# Patient Record
Sex: MALE | Race: WHITE | Employment: OTHER | URBAN - METROPOLITAN AREA
[De-identification: names, ages, dates, MRNs, and addresses within clinical notes are randomized per-mention and may not be internally consistent; named-entity substitution may affect disease eponyms.]

---

## 2017-01-04 RX ORDER — LISINOPRIL AND HYDROCHLOROTHIAZIDE 12.5; 1 MG/1; MG/1
1 TABLET ORAL
Qty: 30 TABLET | Refills: 0 | Status: SHIPPED | OUTPATIENT
Start: 2017-01-04 | End: 2017-09-14

## 2017-01-04 NOTE — TELEPHONE ENCOUNTER
Pt called and is completely out of Lisinopril-Hydrochlorothiazide 10-12.5 MG Oral Tab. Stated that Dr. Nelia Oscar would not refill until has med check appt. Scheduled med check appt for 1/23/17. Soonest he could get in as he needs after work appt.

## 2017-01-24 ENCOUNTER — TELEPHONE (OUTPATIENT)
Dept: FAMILY MEDICINE CLINIC | Facility: CLINIC | Age: 42
End: 2017-01-24

## 2017-01-24 NOTE — TELEPHONE ENCOUNTER
Call back to mel/spouse reaches unidentified voice mail. Voice mailbox full-unable to leave [facility].

## 2017-01-24 NOTE — TELEPHONE ENCOUNTER
Last ofc visit 2/9/15 for cough/acute bronchitis.   Call to mel/spouse-sts \"dr morrison previously told Loomis Money he should have colonoscopy because his father  of colon cancer at age 54.  sts pt has occasional constipation but no pain, bleeding or

## 2017-01-24 NOTE — TELEPHONE ENCOUNTER
I would suggest patient to schedule physical appointment. He needs to also have other preventive screening tests done. Then we can refer him for colonoscopy.   Otherwise I would suggest to see Dr. Nilda Glez and you can give patient information so he can call

## 2017-04-27 ENCOUNTER — APPOINTMENT (OUTPATIENT)
Dept: LAB | Age: 42
End: 2017-04-27
Attending: OTOLARYNGOLOGY
Payer: COMMERCIAL

## 2017-04-27 DIAGNOSIS — I10 ESSENTIAL HYPERTENSION, BENIGN: ICD-10-CM

## 2017-04-27 PROCEDURE — 93010 ELECTROCARDIOGRAM REPORT: CPT | Performed by: INTERNAL MEDICINE

## 2017-04-27 PROCEDURE — 80053 COMPREHEN METABOLIC PANEL: CPT

## 2017-04-27 PROCEDURE — 93005 ELECTROCARDIOGRAM TRACING: CPT

## 2017-04-27 PROCEDURE — 36415 COLL VENOUS BLD VENIPUNCTURE: CPT

## 2017-05-05 ENCOUNTER — LABORATORY ENCOUNTER (OUTPATIENT)
Dept: LAB | Age: 42
End: 2017-05-05
Attending: OTOLARYNGOLOGY
Payer: COMMERCIAL

## 2017-05-05 DIAGNOSIS — Z00.00 EXAMINATION: Primary | ICD-10-CM

## 2017-05-05 PROCEDURE — 88304 TISSUE EXAM BY PATHOLOGIST: CPT

## 2017-09-14 ENCOUNTER — OFFICE VISIT (OUTPATIENT)
Dept: FAMILY MEDICINE CLINIC | Facility: CLINIC | Age: 42
End: 2017-09-14

## 2017-09-14 VITALS
HEIGHT: 72 IN | TEMPERATURE: 98 F | WEIGHT: 250 LBS | SYSTOLIC BLOOD PRESSURE: 162 MMHG | DIASTOLIC BLOOD PRESSURE: 84 MMHG | BODY MASS INDEX: 33.86 KG/M2 | HEART RATE: 80 BPM | RESPIRATION RATE: 18 BRPM

## 2017-09-14 DIAGNOSIS — I10 ESSENTIAL HYPERTENSION: ICD-10-CM

## 2017-09-14 DIAGNOSIS — Z83.71 FAMILY HISTORY OF COLONIC POLYPS: ICD-10-CM

## 2017-09-14 DIAGNOSIS — E78.00 HYPERCHOLESTEREMIA: ICD-10-CM

## 2017-09-14 DIAGNOSIS — I10 ESSENTIAL HYPERTENSION: Primary | ICD-10-CM

## 2017-09-14 DIAGNOSIS — Z80.0 FAMILY HX OF COLON CANCER: ICD-10-CM

## 2017-09-14 DIAGNOSIS — R73.9 HYPERGLYCEMIA: ICD-10-CM

## 2017-09-14 PROCEDURE — 99214 OFFICE O/P EST MOD 30 MIN: CPT | Performed by: FAMILY MEDICINE

## 2017-09-14 RX ORDER — LISINOPRIL AND HYDROCHLOROTHIAZIDE 12.5; 1 MG/1; MG/1
1 TABLET ORAL
Qty: 30 TABLET | Refills: 2 | Status: SHIPPED | OUTPATIENT
Start: 2017-09-14 | End: 2017-09-14

## 2017-09-14 NOTE — PATIENT INSTRUCTIONS
Take blood pressure medication 1 tablet daily in the morning. Do fasting blood work. Call Dr. Merlinda Mattock for colonoscopy. Schedule  follow-up visit in 2 3 months to make sure what medications bringing the blood pressure to optimal control.   Nanci armstrong · Zamiast z salena de la cruzysta? ze akash. · Gdy tylko mo?liwe, i?? na piechot? lub jecha? omari lainez samochodem. · Grabi? li?cie, diamantenywa? prace w ogródku lub napmalena w chasu.   · Byc aktywnym yunior gillespiezynajmniej 30 minut yunior vera wi?kszo?? dni

## 2017-09-14 NOTE — PROGRESS NOTES
Olga Marie is a 38year old male. cc hypertension, hyperlipidemia, family history of colon cancer, hyperglycemia  HPI:   Pretension patient stopped his medications sometime ago. He was not seen in the office since 2015.   Denies any chest pain no short BP (!) 162/84   Pulse 80   Temp 97.6 °F (36.4 °C) (Oral)   Resp 18   Ht 72\"   Wt 250 lb   BMI 33.91 kg/m²   GENERAL: well developed, well nourished,in no apparent distress, obese  SKIN: no rashes,no suspicious lesions  HEENT: atraumatic, normocephalic,ear

## 2017-09-15 RX ORDER — LISINOPRIL AND HYDROCHLOROTHIAZIDE 12.5; 1 MG/1; MG/1
TABLET ORAL
Qty: 90 TABLET | Refills: 1 | Status: SHIPPED | OUTPATIENT
Start: 2017-09-15 | End: 2018-01-12 | Stop reason: CLARIF

## 2017-10-24 ENCOUNTER — APPOINTMENT (OUTPATIENT)
Dept: LAB | Age: 42
End: 2017-10-24
Attending: FAMILY MEDICINE
Payer: COMMERCIAL

## 2017-10-24 DIAGNOSIS — I10 ESSENTIAL HYPERTENSION: ICD-10-CM

## 2017-10-24 DIAGNOSIS — R73.9 HYPERGLYCEMIA: ICD-10-CM

## 2017-10-24 DIAGNOSIS — E78.00 HYPERCHOLESTEREMIA: ICD-10-CM

## 2017-10-24 PROCEDURE — 80061 LIPID PANEL: CPT | Performed by: FAMILY MEDICINE

## 2017-10-24 PROCEDURE — 36415 COLL VENOUS BLD VENIPUNCTURE: CPT | Performed by: FAMILY MEDICINE

## 2017-10-24 PROCEDURE — 83036 HEMOGLOBIN GLYCOSYLATED A1C: CPT | Performed by: FAMILY MEDICINE

## 2017-10-24 PROCEDURE — 80053 COMPREHEN METABOLIC PANEL: CPT | Performed by: FAMILY MEDICINE

## 2018-01-09 ENCOUNTER — TELEPHONE (OUTPATIENT)
Dept: FAMILY MEDICINE CLINIC | Facility: CLINIC | Age: 43
End: 2018-01-09

## 2018-01-09 NOTE — TELEPHONE ENCOUNTER
FYI Spouse called for pt. (She is on his HIPPA) he has been having right arm numbness / tingling off/ on since leo. He has also been having headaches off/ on.  I advised spouse it would be best for him to go to the Urgent care today for evaluation sin

## 2018-01-12 ENCOUNTER — OFFICE VISIT (OUTPATIENT)
Dept: FAMILY MEDICINE CLINIC | Facility: CLINIC | Age: 43
End: 2018-01-12

## 2018-01-12 VITALS
BODY MASS INDEX: 33.72 KG/M2 | SYSTOLIC BLOOD PRESSURE: 146 MMHG | HEART RATE: 75 BPM | DIASTOLIC BLOOD PRESSURE: 82 MMHG | TEMPERATURE: 97 F | WEIGHT: 249 LBS | RESPIRATION RATE: 18 BRPM | HEIGHT: 72 IN

## 2018-01-12 DIAGNOSIS — I10 ESSENTIAL HYPERTENSION: ICD-10-CM

## 2018-01-12 DIAGNOSIS — R20.0 NUMBNESS AND TINGLING IN RIGHT HAND: Primary | ICD-10-CM

## 2018-01-12 DIAGNOSIS — M54.50 ACUTE RIGHT-SIDED LOW BACK PAIN WITHOUT SCIATICA: ICD-10-CM

## 2018-01-12 DIAGNOSIS — R20.2 NUMBNESS AND TINGLING IN RIGHT HAND: Primary | ICD-10-CM

## 2018-01-12 PROCEDURE — 99214 OFFICE O/P EST MOD 30 MIN: CPT | Performed by: FAMILY MEDICINE

## 2018-01-12 RX ORDER — DICLOFENAC SODIUM 75 MG/1
75 TABLET, DELAYED RELEASE ORAL 2 TIMES DAILY
Qty: 30 TABLET | Refills: 0 | Status: SHIPPED | OUTPATIENT
Start: 2018-01-12 | End: 2018-01-12

## 2018-01-12 RX ORDER — LISINOPRIL AND HYDROCHLOROTHIAZIDE 25; 20 MG/1; MG/1
1 TABLET ORAL DAILY
Qty: 30 TABLET | Refills: 1 | Status: SHIPPED | OUTPATIENT
Start: 2018-01-12 | End: 2018-01-12

## 2018-01-12 NOTE — PATIENT INSTRUCTIONS
Call [phone] to schedule EMG test on the right upper extremity. Diclofenac  75 mg 1 tablet twice a day. Warm compresses to the back. Increase lisinopril/hydrochlorothiazide to 20/25 mg 1 tablet daily.     Monitor blood pressure at home write it down

## 2018-01-12 NOTE — PROGRESS NOTES
Estephania Barbosa is a 38year old male. cc right arm pain, low back pain. Hypertension  HPI:   Patient is coming to the office complaining of having pain in the right lower back. It is going on and off.   Started before [place] he was doing some lifting b GENERAL HEALTH: feels well otherwise  SKIN: denies any unusual skin lesions or rashes  HEENT no congestion no runny nose no sore throat  Neck no neck pain  RESPIRATORY: denies shortness of breath with exertion  CARDIOVASCULAR: denies chest pain on exerti Sig: Take 1 tablet by mouth daily. Call [phone] to schedule EMG test on the right upper extremity. Diclofenac  75 mg 1 tablet twice a day. Warm compresses to the back. Increase lisinopril/hydrochlorothiazide to 20/25 mg 1 tablet daily.     Mon

## 2018-01-15 RX ORDER — DICLOFENAC SODIUM 75 MG/1
TABLET, DELAYED RELEASE ORAL
Qty: 30 TABLET | Refills: 0 | Status: SHIPPED | OUTPATIENT
Start: 2018-01-15 | End: 2018-04-25 | Stop reason: ALTCHOICE

## 2018-01-15 RX ORDER — LISINOPRIL AND HYDROCHLOROTHIAZIDE 25; 20 MG/1; MG/1
1 TABLET ORAL DAILY
Qty: 90 TABLET | Refills: 0 | Status: SHIPPED | OUTPATIENT
Start: 2018-01-15 | End: 2018-04-25

## 2018-01-15 NOTE — TELEPHONE ENCOUNTER
Not protocol medication. LOV : 9/14/17 medication follow up   Last labs done :10/24/17  Next appointment :not yet made. Please see pending medication. Refill if appropriate.    Last refill:    Date:1/12/17  Amount :30 tablets no refill   Medication: dic

## 2018-04-16 ENCOUNTER — OFFICE VISIT (OUTPATIENT)
Dept: ELECTROPHYSIOLOGY | Facility: HOSPITAL | Age: 43
End: 2018-04-16
Attending: FAMILY MEDICINE
Payer: COMMERCIAL

## 2018-04-16 DIAGNOSIS — R20.2 NUMBNESS AND TINGLING IN RIGHT HAND: ICD-10-CM

## 2018-04-16 DIAGNOSIS — R20.0 NUMBNESS AND TINGLING IN RIGHT HAND: ICD-10-CM

## 2018-04-16 PROCEDURE — 95886 MUSC TEST DONE W/N TEST COMP: CPT | Performed by: OTHER

## 2018-04-16 PROCEDURE — 95909 NRV CNDJ TST 5-6 STUDIES: CPT | Performed by: OTHER

## 2018-04-16 NOTE — PROCEDURES
[address] [place] [address]  [place], [facility]  [phone]            Patient: Anita Love Sex: Male  Patient ID: [id number] YOB: 1975      Visit Date: 4/16/2018 07:51  Patient Age On Visit Date: Spontaneous MUAP Recruitment   Muscle Nerve Roots IA Fib PSW Fasc H.F. Amp Dur. PPP Pattern   R. Deltoid Axillary C5-C6 N None None None None N N N N   R. Triceps brachii Radial C6-C8 N None None None None N N N N   R.  Biceps brachii Musculocutaneous C5-C

## 2018-04-25 ENCOUNTER — OFFICE VISIT (OUTPATIENT)
Dept: FAMILY MEDICINE CLINIC | Facility: CLINIC | Age: 43
End: 2018-04-25

## 2018-04-25 VITALS
WEIGHT: 245 LBS | BODY MASS INDEX: 33.18 KG/M2 | HEART RATE: 71 BPM | HEIGHT: 72 IN | DIASTOLIC BLOOD PRESSURE: 74 MMHG | OXYGEN SATURATION: 98 % | TEMPERATURE: 97 F | RESPIRATION RATE: 16 BRPM | SYSTOLIC BLOOD PRESSURE: 138 MMHG

## 2018-04-25 DIAGNOSIS — Z12.11 SCREEN FOR COLON CANCER: ICD-10-CM

## 2018-04-25 DIAGNOSIS — M79.604 LOW BACK PAIN RADIATING TO RIGHT LEG: ICD-10-CM

## 2018-04-25 DIAGNOSIS — Z80.0 FAMILY HISTORY OF COLON CANCER: ICD-10-CM

## 2018-04-25 DIAGNOSIS — M54.50 LOW BACK PAIN RADIATING TO RIGHT LEG: ICD-10-CM

## 2018-04-25 DIAGNOSIS — G56.01 CARPAL TUNNEL SYNDROME OF RIGHT WRIST: ICD-10-CM

## 2018-04-25 DIAGNOSIS — I10 ESSENTIAL HYPERTENSION: Primary | ICD-10-CM

## 2018-04-25 PROCEDURE — 99214 OFFICE O/P EST MOD 30 MIN: CPT | Performed by: FAMILY MEDICINE

## 2018-04-25 RX ORDER — LISINOPRIL AND HYDROCHLOROTHIAZIDE 25; 20 MG/1; MG/1
1 TABLET ORAL DAILY
Qty: 90 TABLET | Refills: 0 | Status: SHIPPED | OUTPATIENT
Start: 2018-04-25 | End: 2018-10-24

## 2018-04-25 NOTE — PROGRESS NOTES
Rheba Babinski is a 38year old male. cc hypertension, numbness and tingling in the right hand, low back pain, family history of colon cancer. HPI:   Hypertension patient is taking medications regularly doing well. Needs refill.   No chest pain no shortne pain  RESPIRATORY: denies shortness of breath with exertion  CARDIOVASCULAR: denies chest pain on exertion  GI: denies abdominal pain and denies heartburn  NEURO: denies headaches, numbness and tingling of the right hand  Musculoskeletal low back pain radi

## 2018-04-25 NOTE — PATIENT INSTRUCTIONS
Continue current meds. Watch diet for fats and carbs. Stay active. Vitamin B12 1000 mcg daily over-the-counter. Call GI Dr. Velma Bermeo for colonoscopy. Schedule physical therapy for your back. Schedule complete physical in 3 months.

## 2018-10-12 ENCOUNTER — TELEPHONE (OUTPATIENT)
Dept: FAMILY MEDICINE CLINIC | Facility: CLINIC | Age: 43
End: 2018-10-12

## 2018-10-12 NOTE — TELEPHONE ENCOUNTER
Spoke with the wife Curt Darnell and informed her that her if 10/24/2018 ok for her  to come in to see Dr. Prado Memos.    Per the wife she stated ok and I will tell him.

## 2018-10-24 ENCOUNTER — LAB ENCOUNTER (OUTPATIENT)
Dept: LAB | Age: 43
End: 2018-10-24
Attending: FAMILY MEDICINE
Payer: COMMERCIAL

## 2018-10-24 ENCOUNTER — OFFICE VISIT (OUTPATIENT)
Dept: FAMILY MEDICINE CLINIC | Facility: CLINIC | Age: 43
End: 2018-10-24

## 2018-10-24 VITALS
HEIGHT: 72 IN | DIASTOLIC BLOOD PRESSURE: 100 MMHG | HEART RATE: 78 BPM | BODY MASS INDEX: 34.4 KG/M2 | TEMPERATURE: 98 F | SYSTOLIC BLOOD PRESSURE: 154 MMHG | WEIGHT: 254 LBS | RESPIRATION RATE: 18 BRPM

## 2018-10-24 DIAGNOSIS — I10 ESSENTIAL HYPERTENSION: ICD-10-CM

## 2018-10-24 DIAGNOSIS — R10.9 LEFT SIDED ABDOMINAL PAIN OF UNKNOWN CAUSE: ICD-10-CM

## 2018-10-24 DIAGNOSIS — I10 ESSENTIAL HYPERTENSION: Primary | ICD-10-CM

## 2018-10-24 PROCEDURE — 83690 ASSAY OF LIPASE: CPT

## 2018-10-24 PROCEDURE — 81003 URINALYSIS AUTO W/O SCOPE: CPT

## 2018-10-24 PROCEDURE — 80053 COMPREHEN METABOLIC PANEL: CPT

## 2018-10-24 PROCEDURE — 99214 OFFICE O/P EST MOD 30 MIN: CPT | Performed by: FAMILY MEDICINE

## 2018-10-24 PROCEDURE — 85025 COMPLETE CBC W/AUTO DIFF WBC: CPT

## 2018-10-24 PROCEDURE — 36415 COLL VENOUS BLD VENIPUNCTURE: CPT

## 2018-10-24 RX ORDER — LISINOPRIL AND HYDROCHLOROTHIAZIDE 25; 20 MG/1; MG/1
1 TABLET ORAL DAILY
Qty: 90 TABLET | Refills: 0 | Status: SHIPPED | OUTPATIENT
Start: 2018-10-24 | End: 2018-11-30

## 2018-10-24 NOTE — PROGRESS NOTES
Fabrizio Brantley is a 37year old male. cc hypertension, left-sided abdominal pain. HPI:   Patient is coming for follow-up of hypertension he is not taking medication for the past 2 weeks his blood pressure was very high.   He denies any headaches no chest p (36.6 °C) (Oral)   Resp 18   Ht 72\"   Wt 254 lb   BMI 34.45 kg/m²   GENERAL: well developed, well nourished,in no apparent distress  SKIN: no rashes,no suspicious lesions  HEENT: atraumatic, normocephalic,ears and throat are clear  NECK: supple,no adenopa

## 2018-10-24 NOTE — PATIENT INSTRUCTIONS
Restart lisinopril/hydrochlorothiazide. Keep good hydration. We will call you blood test results when those are back. Call [phone] to schedule ultrasound of the abdomen.   Follow-up at the end of November for blood pressure visit  Work on smoking cess

## 2018-10-25 DIAGNOSIS — E87.5 HYPERKALEMIA: Primary | ICD-10-CM

## 2018-11-05 ENCOUNTER — HOSPITAL ENCOUNTER (OUTPATIENT)
Dept: ULTRASOUND IMAGING | Age: 43
Discharge: HOME | End: 2018-11-05
Attending: FAMILY MEDICINE
Payer: COMMERCIAL

## 2018-11-05 DIAGNOSIS — R10.9 LEFT SIDED ABDOMINAL PAIN OF UNKNOWN CAUSE: ICD-10-CM

## 2018-11-05 PROCEDURE — 76700 US EXAM ABDOM COMPLETE: CPT | Performed by: FAMILY MEDICINE

## 2018-11-16 ENCOUNTER — APPOINTMENT (OUTPATIENT)
Dept: LAB | Age: 43
End: 2018-11-16
Attending: FAMILY MEDICINE
Payer: COMMERCIAL

## 2018-11-16 DIAGNOSIS — E87.5 HYPERKALEMIA: ICD-10-CM

## 2018-11-16 PROCEDURE — 80048 BASIC METABOLIC PNL TOTAL CA: CPT

## 2018-11-16 PROCEDURE — 36415 COLL VENOUS BLD VENIPUNCTURE: CPT

## 2018-11-30 ENCOUNTER — OFFICE VISIT (OUTPATIENT)
Dept: FAMILY MEDICINE CLINIC | Facility: CLINIC | Age: 43
End: 2018-11-30

## 2018-11-30 VITALS
TEMPERATURE: 97 F | SYSTOLIC BLOOD PRESSURE: 116 MMHG | WEIGHT: 246 LBS | HEART RATE: 75 BPM | RESPIRATION RATE: 18 BRPM | DIASTOLIC BLOOD PRESSURE: 72 MMHG | HEIGHT: 72 IN | BODY MASS INDEX: 33.32 KG/M2

## 2018-11-30 DIAGNOSIS — E66.9 OBESITY (BMI 30-39.9): ICD-10-CM

## 2018-11-30 DIAGNOSIS — R73.9 HYPERGLYCEMIA: ICD-10-CM

## 2018-11-30 DIAGNOSIS — K76.0 FATTY LIVER: ICD-10-CM

## 2018-11-30 DIAGNOSIS — I10 ESSENTIAL HYPERTENSION: Primary | ICD-10-CM

## 2018-11-30 PROCEDURE — 99214 OFFICE O/P EST MOD 30 MIN: CPT | Performed by: FAMILY MEDICINE

## 2018-11-30 RX ORDER — LISINOPRIL AND HYDROCHLOROTHIAZIDE 25; 20 MG/1; MG/1
1 TABLET ORAL DAILY
Qty: 90 TABLET | Refills: 1 | Status: SHIPPED | OUTPATIENT
Start: 2018-11-30 | End: 2019-12-31

## 2018-11-30 NOTE — PROGRESS NOTES
Jarrett Mcnamara is a 37year old male. cc hypertension, hyperglycemia, fatty liver  HPI:   Patient is coming for follow-up of hypertension taking medications doing well. Blood pressure stable.   Tolerates medication without any problems    Hyperglycemia susan edema  Psychiatric - alert  and oriented x3, normal mood   ASSESSMENT AND PLAN:     Essential hypertension  (primary encounter diagnosis)  Fatty liver  Obesity (bmi 30-39. 9)  Hyperglycemia    No orders of the defined types were placed in this encounter.

## 2018-11-30 NOTE — PATIENT INSTRUCTIONS
Continue current medications. Try to lose some weight 10-15 pounds. Stay active. Low-fat diet. Low carbohydrate diet. Schedule physical for April of this year.

## 2019-01-31 RX ORDER — LISINOPRIL AND HYDROCHLOROTHIAZIDE 25; 20 MG/1; MG/1
1 TABLET ORAL DAILY
Qty: 90 TABLET | Refills: 0 | Status: SHIPPED | OUTPATIENT
Start: 2019-01-31 | End: 2020-02-18

## 2019-02-21 ENCOUNTER — HOSPITAL ENCOUNTER (OUTPATIENT)
Age: 44
Discharge: HOME | End: 2019-02-21
Payer: COMMERCIAL

## 2019-02-21 VITALS
TEMPERATURE: 99 F | DIASTOLIC BLOOD PRESSURE: 75 MMHG | RESPIRATION RATE: 20 BRPM | OXYGEN SATURATION: 97 % | HEART RATE: 114 BPM | SYSTOLIC BLOOD PRESSURE: 115 MMHG

## 2019-02-21 DIAGNOSIS — B34.9 VIRAL SYNDROME: ICD-10-CM

## 2019-02-21 DIAGNOSIS — J02.0 STREP PHARYNGITIS: Primary | ICD-10-CM

## 2019-02-21 LAB
POCT INFLUENZA A: NEGATIVE
POCT INFLUENZA B: NEGATIVE
POCT RAPID STREP: POSITIVE

## 2019-02-21 PROCEDURE — 99213 OFFICE O/P EST LOW 20 MIN: CPT

## 2019-02-21 PROCEDURE — 87502 INFLUENZA DNA AMP PROBE: CPT | Performed by: PHYSICIAN ASSISTANT

## 2019-02-21 PROCEDURE — 87430 STREP A AG IA: CPT | Performed by: PHYSICIAN ASSISTANT

## 2019-02-21 PROCEDURE — 99204 OFFICE O/P NEW MOD 45 MIN: CPT

## 2019-02-21 RX ORDER — IBUPROFEN 600 MG/1
600 TABLET ORAL ONCE
Status: COMPLETED | OUTPATIENT
Start: 2019-02-21 | End: 2019-02-21

## 2019-02-21 RX ORDER — AMOXICILLIN 875 MG/1
875 TABLET, COATED ORAL 2 TIMES DAILY
Qty: 20 TABLET | Refills: 0 | Status: SHIPPED | OUTPATIENT
Start: 2019-02-21 | End: 2019-03-03

## 2019-02-21 NOTE — ED INITIAL ASSESSMENT (HPI)
Chills- started last night at about 9 pm. Chills with sweating. ,pt took theraflu and nyquil. Pt still this having aches. Pt states he feels hot and cold.       Sore throat- started today

## 2019-02-21 NOTE — ED PROVIDER NOTES
Patient Seen in: [facility]    History   Patient presents with:   Body ache and/or chills  Sore Throat    Stated Complaint: fever & fatigue    HPI    49-year-old male here with complaint of sore throat pain in tandem with fever c normal.   Left Ear: Tympanic membrane and external ear normal.   Nose: Nose normal.   Mouth/Throat: Uvula is midline and mucous membranes are normal. Posterior oropharyngeal erythema present.    Uvula midline, no trismus or drooling, also in the posterior p taking these medications    amoxicillin 875 MG Oral Tab  Take 1 tablet (875 mg total) by mouth 2 (two) times daily for 10 days.   Qty: 20 tablet Refills: 0

## 2019-02-21 NOTE — ED NOTES
Pt here for chills, bodyaches,sore throat since yesterday night at 9pm. Today he felt better better but not 100%. Pt appears tired-looking.

## 2019-05-11 ENCOUNTER — TELEPHONE (OUTPATIENT)
Dept: FAMILY MEDICINE CLINIC | Facility: CLINIC | Age: 44
End: 2019-05-11

## 2019-05-11 NOTE — TELEPHONE ENCOUNTER
Please give patient a call. He is due for his physical for this year. Please schedule sometimes this  Summer/ early fall so we can update order preventive services for him.   Thank you

## 2019-05-21 NOTE — TELEPHONE ENCOUNTER
LM with wife for patient to schedule physical. She is going to check patients schedule and call back.

## 2019-08-17 ENCOUNTER — HOSPITAL ENCOUNTER (OUTPATIENT)
Age: 44
Discharge: HOME | End: 2019-08-17
Attending: FAMILY MEDICINE
Payer: COMMERCIAL

## 2019-08-17 VITALS
WEIGHT: 240 LBS | SYSTOLIC BLOOD PRESSURE: 121 MMHG | HEIGHT: 72 IN | HEART RATE: 94 BPM | RESPIRATION RATE: 18 BRPM | BODY MASS INDEX: 32.51 KG/M2 | OXYGEN SATURATION: 98 % | TEMPERATURE: 98 F | DIASTOLIC BLOOD PRESSURE: 79 MMHG

## 2019-08-17 DIAGNOSIS — J20.9 ACUTE BRONCHITIS, UNSPECIFIED ORGANISM: Primary | ICD-10-CM

## 2019-08-17 PROCEDURE — 99213 OFFICE O/P EST LOW 20 MIN: CPT

## 2019-08-17 PROCEDURE — 99214 OFFICE O/P EST MOD 30 MIN: CPT

## 2019-08-17 RX ORDER — PREDNISONE 20 MG/1
40 TABLET ORAL DAILY
Qty: 10 TABLET | Refills: 0 | Status: SHIPPED | OUTPATIENT
Start: 2019-08-17 | End: 2019-08-22

## 2019-08-17 RX ORDER — FLUTICASONE PROPIONATE 50 MCG
2 SPRAY, SUSPENSION (ML) NASAL DAILY
Qty: 16 G | Refills: 0 | Status: SHIPPED | OUTPATIENT
Start: 2019-08-17 | End: 2019-09-16

## 2019-08-17 RX ORDER — BENZONATATE 100 MG/1
100 CAPSULE ORAL 3 TIMES DAILY PRN
Qty: 30 CAPSULE | Refills: 0 | Status: SHIPPED | OUTPATIENT
Start: 2019-08-17 | End: 2019-09-16

## 2019-08-17 RX ORDER — CODEINE PHOSPHATE AND GUAIFENESIN 10; 100 MG/5ML; MG/5ML
5 SOLUTION ORAL NIGHTLY PRN
Qty: 50 ML | Refills: 0 | Status: SHIPPED | OUTPATIENT
Start: 2019-08-17 | End: 2019-08-27

## 2019-08-17 NOTE — ED INITIAL ASSESSMENT (HPI)
Cough development Wednesday. Patient arrived home from [place] on Monday. Complains of soreness to the throat with cough. Denies chills or fever. Wife ill with same symptoms this week.

## 2019-08-17 NOTE — ED PROVIDER NOTES
Patient Seen in: [facility]    History   Patient presents with:  Cough/URI    Stated Complaint: COUGH X 2 DAYS    HPI    24-year-old male with history of hypertension and status post tonsillectomy presents the IC secondary to co exam. Bilateral TMs are clear. Nose: Bilateral nares are boggy with mucus. Mouth: MMM. Posterior pharynx with mild erythema. No exudate. Uvula is midline. Neck: Supple, NT, FROM. No meningeal signs. LAD: No lymphadenopathy.   Heart: S1,S2 RRR, No murm

## 2019-09-03 ENCOUNTER — TELEPHONE (OUTPATIENT)
Dept: FAMILY MEDICINE CLINIC | Facility: CLINIC | Age: 44
End: 2019-09-03

## 2019-09-03 NOTE — TELEPHONE ENCOUNTER
Please call patient to schedule follow up with Dr. Bay Mckenzie for HTN. He is overdue. Please schedule in the next month.

## 2019-09-13 ENCOUNTER — APPOINTMENT (OUTPATIENT)
Dept: GENERAL RADIOLOGY | Age: 44
End: 2019-09-13
Attending: PHYSICIAN ASSISTANT
Payer: COMMERCIAL

## 2019-09-13 ENCOUNTER — HOSPITAL ENCOUNTER (OUTPATIENT)
Age: 44
Discharge: HOME | End: 2019-09-13
Payer: COMMERCIAL

## 2019-09-13 VITALS
HEART RATE: 88 BPM | DIASTOLIC BLOOD PRESSURE: 78 MMHG | SYSTOLIC BLOOD PRESSURE: 145 MMHG | OXYGEN SATURATION: 97 % | RESPIRATION RATE: 16 BRPM | TEMPERATURE: 98 F

## 2019-09-13 DIAGNOSIS — J01.41 ACUTE RECURRENT PANSINUSITIS: Primary | ICD-10-CM

## 2019-09-13 DIAGNOSIS — R09.82 PND (POST-NASAL DRIP): ICD-10-CM

## 2019-09-13 DIAGNOSIS — R05.8 PRODUCTIVE COUGH: ICD-10-CM

## 2019-09-13 DIAGNOSIS — R06.2 WHEEZING ON BOTH SIDES OF CHEST: ICD-10-CM

## 2019-09-13 PROCEDURE — 99214 OFFICE O/P EST MOD 30 MIN: CPT

## 2019-09-13 PROCEDURE — 71046 X-RAY EXAM CHEST 2 VIEWS: CPT | Performed by: PHYSICIAN ASSISTANT

## 2019-09-13 PROCEDURE — 94640 AIRWAY INHALATION TREATMENT: CPT

## 2019-09-13 RX ORDER — ALBUTEROL SULFATE 90 UG/1
2 AEROSOL, METERED RESPIRATORY (INHALATION) EVERY 4 HOURS PRN
Qty: 1 INHALER | Refills: 0 | Status: SHIPPED | OUTPATIENT
Start: 2019-09-13 | End: 2019-10-13

## 2019-09-13 RX ORDER — MONTELUKAST SODIUM 10 MG/1
10 TABLET ORAL NIGHTLY
Qty: 30 TABLET | Refills: 0 | Status: SHIPPED | OUTPATIENT
Start: 2019-09-13 | End: 2020-02-18 | Stop reason: ALTCHOICE

## 2019-09-13 RX ORDER — PREDNISONE 20 MG/1
40 TABLET ORAL DAILY
Qty: 10 TABLET | Refills: 0 | Status: SHIPPED | OUTPATIENT
Start: 2019-09-13 | End: 2019-09-18

## 2019-09-13 RX ORDER — DEXAMETHASONE SODIUM PHOSPHATE 4 MG/ML
10 VIAL (ML) INJECTION ONCE
Status: COMPLETED | OUTPATIENT
Start: 2019-09-13 | End: 2019-09-13

## 2019-09-13 RX ORDER — DOXYCYCLINE HYCLATE 100 MG/1
100 CAPSULE ORAL 2 TIMES DAILY
Qty: 20 CAPSULE | Refills: 0 | Status: SHIPPED | OUTPATIENT
Start: 2019-09-13 | End: 2019-09-23

## 2019-09-13 RX ORDER — IPRATROPIUM BROMIDE AND ALBUTEROL SULFATE 2.5; .5 MG/3ML; MG/3ML
3 SOLUTION RESPIRATORY (INHALATION) ONCE
Status: COMPLETED | OUTPATIENT
Start: 2019-09-13 | End: 2019-09-13

## 2019-09-13 NOTE — ED INITIAL ASSESSMENT (HPI)
Here for recheck of cough that started over 1 month ago, which he was seen here for. Sts that the cough started to improve, but the got worse. Denies fever.

## 2019-09-13 NOTE — ED PROVIDER NOTES
Patient Seen in: [facility]    History   Patient presents with:  Cough    Stated Complaint: cough and congestion 3 days    HPI    40-year-old male here with complaint of sinus pain and pressure with purulent drainage and a produ 97%         Physical Exam   Constitutional: He is oriented to person, place, and time. He appears well-developed and well-nourished. HENT:   Head: Normocephalic. Right Ear: External ear normal. Tympanic membrane is bulging.    Left Ear: External ear nor MDM   Clinical Impression: sinusitis recurrent/productive cough/wheezing/PND  Course of Treatment: Please push fluids and rest.  Take the singular daily. Use your inhaler as needed.   We will give you another round of prednisone and take the full cou

## 2019-10-09 NOTE — TELEPHONE ENCOUNTER
Patient is overdue for HTN check/follow up with Dr. Elisa Salgado- please schedule for this month.  Thank you

## 2019-12-31 RX ORDER — LISINOPRIL AND HYDROCHLOROTHIAZIDE 25; 20 MG/1; MG/1
1 TABLET ORAL DAILY
Qty: 90 TABLET | Refills: 0 | Status: SHIPPED | OUTPATIENT
Start: 2019-12-31 | End: 2020-02-18

## 2020-02-18 ENCOUNTER — OFFICE VISIT (OUTPATIENT)
Dept: FAMILY MEDICINE CLINIC | Facility: CLINIC | Age: 45
End: 2020-02-18

## 2020-02-18 ENCOUNTER — APPOINTMENT (OUTPATIENT)
Dept: LAB | Age: 45
End: 2020-02-18
Attending: FAMILY MEDICINE
Payer: COMMERCIAL

## 2020-02-18 VITALS
OXYGEN SATURATION: 98 % | BODY MASS INDEX: 33.72 KG/M2 | SYSTOLIC BLOOD PRESSURE: 139 MMHG | DIASTOLIC BLOOD PRESSURE: 85 MMHG | RESPIRATION RATE: 18 BRPM | TEMPERATURE: 97 F | WEIGHT: 249 LBS | HEIGHT: 72 IN | HEART RATE: 83 BPM

## 2020-02-18 DIAGNOSIS — I10 ESSENTIAL HYPERTENSION: ICD-10-CM

## 2020-02-18 DIAGNOSIS — R73.9 HYPERGLYCEMIA: ICD-10-CM

## 2020-02-18 DIAGNOSIS — I10 ESSENTIAL HYPERTENSION: Primary | ICD-10-CM

## 2020-02-18 DIAGNOSIS — E78.00 HYPERCHOLESTEREMIA: ICD-10-CM

## 2020-02-18 LAB
ALBUMIN SERPL-MCNC: 3.6 G/DL (ref 3.4–5)
ALBUMIN/GLOB SERPL: 0.9 {RATIO} (ref 1–2)
ALP LIVER SERPL-CCNC: 88 U/L (ref 45–117)
ALT SERPL-CCNC: 56 U/L (ref 16–61)
ANION GAP SERPL CALC-SCNC: 5 MMOL/L (ref 0–18)
AST SERPL-CCNC: 27 U/L (ref 15–37)
BILIRUB SERPL-MCNC: 0.4 MG/DL (ref 0.1–2)
BUN BLD-MCNC: 21 MG/DL (ref 7–18)
BUN/CREAT SERPL: 17.8 (ref 10–20)
CALCIUM BLD-MCNC: 9.3 MG/DL (ref 8.5–10.1)
CHLORIDE SERPL-SCNC: 108 MMOL/L (ref 98–112)
CHOLEST SMN-MCNC: 213 MG/DL (ref ?–200)
CO2 SERPL-SCNC: 25 MMOL/L (ref 21–32)
CREAT BLD-MCNC: 1.18 MG/DL (ref 0.7–1.3)
EST. AVERAGE GLUCOSE BLD GHB EST-MCNC: 137 MG/DL (ref 68–126)
GLOBULIN PLAS-MCNC: 4 G/DL (ref 2.8–4.4)
GLUCOSE BLD-MCNC: 128 MG/DL (ref 70–99)
HBA1C MFR BLD HPLC: 6.4 % (ref ?–5.7)
HDLC SERPL-MCNC: 53 MG/DL (ref 40–59)
LDLC SERPL CALC-MCNC: 146 MG/DL (ref ?–100)
M PROTEIN MFR SERPL ELPH: 7.6 G/DL (ref 6.4–8.2)
NONHDLC SERPL-MCNC: 160 MG/DL (ref ?–130)
OSMOLALITY SERPL CALC.SUM OF ELEC: 291 MOSM/KG (ref 275–295)
PATIENT FASTING Y/N/NP: YES
PATIENT FASTING Y/N/NP: YES
POTASSIUM SERPL-SCNC: 4.3 MMOL/L (ref 3.5–5.1)
SODIUM SERPL-SCNC: 138 MMOL/L (ref 136–145)
TRIGL SERPL-MCNC: 68 MG/DL (ref 30–149)
VLDLC SERPL CALC-MCNC: 14 MG/DL (ref 0–30)

## 2020-02-18 PROCEDURE — 83036 HEMOGLOBIN GLYCOSYLATED A1C: CPT

## 2020-02-18 PROCEDURE — 99214 OFFICE O/P EST MOD 30 MIN: CPT | Performed by: FAMILY MEDICINE

## 2020-02-18 PROCEDURE — 80061 LIPID PANEL: CPT

## 2020-02-18 PROCEDURE — 80053 COMPREHEN METABOLIC PANEL: CPT

## 2020-02-18 PROCEDURE — 36415 COLL VENOUS BLD VENIPUNCTURE: CPT

## 2020-02-18 RX ORDER — LISINOPRIL AND HYDROCHLOROTHIAZIDE 25; 20 MG/1; MG/1
1 TABLET ORAL DAILY
Qty: 90 TABLET | Refills: 1 | Status: SHIPPED | OUTPATIENT
Start: 2020-02-18 | End: 2020-04-15

## 2020-02-18 NOTE — PROGRESS NOTES
Olga Arredondo is a 53year old male. cc hypertension, hyperlipidemia, hyperglycemia  HPI:   Patient coming for follow-up of hypertension he is taking lisinopril hydrochlorothiazide at home. He forgot to take the pill today.   Denies any chest pain or shor auscultation  CARDIO: RRR without murmur  GI: good BS's,no masses, HSM or tenderness  EXTREMITIES: no edema  Psychiatric - alert  and oriented x3, normal mood     ASSESSMENT AND PLAN:     Essential hypertension  (primary encounter diagnosis)  Hyperglycemia

## 2020-02-18 NOTE — PATIENT INSTRUCTIONS
Continue current meds. Watch diet for fats and carbs. Stay active. Monitor  blood pressure at home. Schedule physical for the summer.

## 2020-04-15 RX ORDER — LISINOPRIL AND HYDROCHLOROTHIAZIDE 25; 20 MG/1; MG/1
1 TABLET ORAL DAILY
Qty: 90 TABLET | Refills: 0 | Status: SHIPPED | OUTPATIENT
Start: 2020-04-15 | End: 2021-05-04

## 2021-04-07 ENCOUNTER — TELEPHONE (OUTPATIENT)
Dept: FAMILY MEDICINE CLINIC | Facility: CLINIC | Age: 46
End: 2021-04-07

## 2021-05-04 RX ORDER — LISINOPRIL AND HYDROCHLOROTHIAZIDE 25; 20 MG/1; MG/1
1 TABLET ORAL DAILY
Qty: 30 TABLET | Refills: 0 | Status: SHIPPED | OUTPATIENT
Start: 2021-05-04 | End: 2021-06-28

## 2021-05-04 NOTE — TELEPHONE ENCOUNTER
Called patient to schedule appointment. Wife said they are going out of the country in 2 days and wont be back until 2 weeks. Wife wants to know if he can have refill on medication and he will schedule his appointment when he comes back?

## 2021-06-22 NOTE — TELEPHONE ENCOUNTER
-- DO NOT REPLY / DO NOT REPLY ALL --  -- Message is from the [facility]--    COVID-19 Universal Screening: N/A - Not about scheduling    General Patient Message      Reason for Call: Patient's mom has an inquiry regarding insurance card. Patient has an appointment scheduled on 6/24 at 4:20pm. Grandmother will take patient to appointment and she does not have a copy of insurance cards. Since information is on file, would like to know if she needs to have physical copy of insurance cards.     Caller Information       Type Contact Phone    06/22/2021 06:27 PM CDT Phone (Incoming) LAXMI ARRIAGA (Mother) [phone] (M)          Alternative phone number: None        Did the caller agree that this message can wait until the office reopens in the morning? YES - The Message Can Wait      Send a message to the provider’s clinical support pool.     Turnaround time given to caller:   \"This message will be sent to [state Provider's name]. The clinical team will fulfill your request as soon as they review your message when the office opens tomorrow.\"         Pt wife transferred to nurse with numbness in R arm

## 2021-06-24 DIAGNOSIS — I10 ESSENTIAL HYPERTENSION: Primary | ICD-10-CM

## 2021-06-28 RX ORDER — LISINOPRIL AND HYDROCHLOROTHIAZIDE 25; 20 MG/1; MG/1
1 TABLET ORAL DAILY
Qty: 30 TABLET | Refills: 0 | Status: SHIPPED | OUTPATIENT
Start: 2021-06-28 | End: 2021-09-22

## 2021-07-29 ENCOUNTER — TELEPHONE (OUTPATIENT)
Dept: FAMILY MEDICINE CLINIC | Facility: CLINIC | Age: 46
End: 2021-07-29

## 2021-07-29 NOTE — TELEPHONE ENCOUNTER
Medical Records Request received from [facility] for records from 7/23/16 to 7/23/21. Release original sent to [employer] on 7/29/21. Copy sent to scanning.

## 2021-09-21 DIAGNOSIS — I10 ESSENTIAL HYPERTENSION: ICD-10-CM

## 2021-09-22 RX ORDER — LISINOPRIL AND HYDROCHLOROTHIAZIDE 25; 20 MG/1; MG/1
1 TABLET ORAL DAILY
Qty: 15 TABLET | Refills: 0 | Status: SHIPPED | OUTPATIENT
Start: 2021-09-22

## 2022-01-05 ENCOUNTER — TELEPHONE (OUTPATIENT)
Dept: FAMILY MEDICINE CLINIC | Facility: CLINIC | Age: 47
End: 2022-01-05

## 2022-01-05 NOTE — TELEPHONE ENCOUNTER
Pt wife called to request appointment for pt with Dr Barrera Carlson for pts cough. States has multiple negative at home Covid tests. requesting appointment for same day 1/5/22    Sent message to Dr. Barrera Carlson at 10:17 AM on 1/5/22 via secure chat.     Receiv

## 2022-03-12 ENCOUNTER — OFFICE VISIT (OUTPATIENT)
Dept: FAMILY MEDICINE CLINIC | Facility: CLINIC | Age: 47
End: 2022-03-12
Payer: COMMERCIAL

## 2022-03-12 VITALS
HEIGHT: 72 IN | TEMPERATURE: 99 F | RESPIRATION RATE: 18 BRPM | BODY MASS INDEX: 36.57 KG/M2 | DIASTOLIC BLOOD PRESSURE: 102 MMHG | WEIGHT: 270 LBS | SYSTOLIC BLOOD PRESSURE: 168 MMHG | OXYGEN SATURATION: 98 % | HEART RATE: 85 BPM

## 2022-03-12 DIAGNOSIS — I10 ESSENTIAL HYPERTENSION: Primary | ICD-10-CM

## 2022-03-12 DIAGNOSIS — R73.9 HYPERGLYCEMIA: ICD-10-CM

## 2022-03-12 DIAGNOSIS — Z12.5 SCREENING FOR MALIGNANT NEOPLASM OF PROSTATE: ICD-10-CM

## 2022-03-12 DIAGNOSIS — E66.09 CLASS 2 OBESITY DUE TO EXCESS CALORIES WITHOUT SERIOUS COMORBIDITY WITH BODY MASS INDEX (BMI) OF 35.0 TO 35.9 IN ADULT: ICD-10-CM

## 2022-03-12 DIAGNOSIS — Z13.89 SCREENING FOR GENITOURINARY CONDITION: ICD-10-CM

## 2022-03-12 DIAGNOSIS — E78.00 HYPERCHOLESTEREMIA: ICD-10-CM

## 2022-03-12 DIAGNOSIS — Z00.00 LABORATORY EXAM ORDERED AS PART OF ROUTINE GENERAL MEDICAL EXAMINATION: ICD-10-CM

## 2022-03-12 PROCEDURE — 3008F BODY MASS INDEX DOCD: CPT | Performed by: FAMILY MEDICINE

## 2022-03-12 PROCEDURE — 3080F DIAST BP >= 90 MM HG: CPT | Performed by: FAMILY MEDICINE

## 2022-03-12 PROCEDURE — 3077F SYST BP >= 140 MM HG: CPT | Performed by: FAMILY MEDICINE

## 2022-03-12 PROCEDURE — 99214 OFFICE O/P EST MOD 30 MIN: CPT | Performed by: FAMILY MEDICINE

## 2022-03-12 RX ORDER — LISINOPRIL AND HYDROCHLOROTHIAZIDE 25; 20 MG/1; MG/1
1 TABLET ORAL DAILY
Qty: 30 TABLET | Refills: 2 | Status: SHIPPED | OUTPATIENT
Start: 2022-03-12

## 2022-03-12 NOTE — PATIENT INSTRUCTIONS
Restart lisinopril/hydrochlorothiazide 1 tablet daily. Monitor blood pressure at home. Blood pressure goal is less than 140/90. Low-fat diet. Stay active. Low-salt diet. Call [phone] to schedule fasting blood work and have it done 1 week prior complete physical.  Schedule complete physical for April/May 2022.

## 2022-04-15 RX ORDER — LISINOPRIL AND HYDROCHLOROTHIAZIDE 25; 20 MG/1; MG/1
1 TABLET ORAL DAILY
Qty: 90 TABLET | Refills: 0 | Status: SHIPPED | OUTPATIENT
Start: 2022-04-15

## 2022-05-11 PROBLEM — R05.9 COUGH: Status: ACTIVE | Noted: 2022-01-07

## 2022-05-11 PROBLEM — R09.81 NASAL CONGESTION: Status: ACTIVE | Noted: 2022-01-07

## 2022-05-11 PROBLEM — J02.9 ACUTE PHARYNGITIS: Status: ACTIVE | Noted: 2022-01-07

## 2022-06-30 ENCOUNTER — TELEPHONE (OUTPATIENT)
Dept: FAMILY MEDICINE CLINIC | Facility: CLINIC | Age: 47
End: 2022-06-30

## 2022-06-30 NOTE — TELEPHONE ENCOUNTER
While speaking to wife on her test results, she is asking for an apt for her . Zahra Fernandez he has gained a lot of wt, has fatigue. Doesn't feel well for a while. Asks he is should see a cardiologist? I asked if any cp or sob-denies this. I said I see he is on bp med, she does not feel this is happening. Very vague in sx's. Just wants him seen with Dr Saba Fabian. Said their ins is ending in 1-2 mos so wants appt before it runs out. I did remind her that he has labs in system from may that he has not done yet. She will have him do. Dr Chris Turk advise where you can see pt? July is full. She is aware you are out until next week.

## 2022-07-05 NOTE — TELEPHONE ENCOUNTER
Yes I  like patient to complete all the blood work. Please offer him August 10th appointment , okay to use [facility]. Please also make patient aware that last time he did not show for his appointment so he is not able to make that appointment I would like him to give us a call and let us know so we can accommodate someone else.   Thank you

## 2022-07-07 NOTE — TELEPHONE ENCOUNTER
S/w wife on this. Advised of below. She voiced understanding. Agrees to appt. Apt made. Then sts \"while I have you on the phone\", has more questions on her results. See her chart.

## 2022-07-13 DIAGNOSIS — I10 ESSENTIAL HYPERTENSION: ICD-10-CM

## 2022-07-13 RX ORDER — LISINOPRIL AND HYDROCHLOROTHIAZIDE 25; 20 MG/1; MG/1
1 TABLET ORAL DAILY
Qty: 90 TABLET | Refills: 0 | Status: SHIPPED | OUTPATIENT
Start: 2022-07-13

## 2022-07-13 NOTE — TELEPHONE ENCOUNTER
LOV: 03/12/2022  Last Refill:   LISINOPRIL-HYDROCHLOROTHIAZIDE 20-25 MG Oral Tab 90 tablet 0 4/15/2022     RTC: 08/10/2022  Protocol: failed   CMP or BMP in past 12 months    Last serum creatinine< 2.0    Appointment in past 6 or next 3 months

## 2022-09-10 ENCOUNTER — LAB ENCOUNTER (OUTPATIENT)
Dept: LAB | Facility: REFERENCE LAB | Age: 47
End: 2022-09-10
Attending: FAMILY MEDICINE
Payer: COMMERCIAL

## 2022-09-10 DIAGNOSIS — R73.9 HYPERGLYCEMIA: Primary | ICD-10-CM

## 2022-09-10 DIAGNOSIS — Z12.5 SCREENING FOR MALIGNANT NEOPLASM OF PROSTATE: ICD-10-CM

## 2022-09-10 DIAGNOSIS — R73.9 HYPERGLYCEMIA: ICD-10-CM

## 2022-09-10 DIAGNOSIS — Z13.89 SCREENING FOR GENITOURINARY CONDITION: ICD-10-CM

## 2022-09-10 DIAGNOSIS — Z00.00 LABORATORY EXAM ORDERED AS PART OF ROUTINE GENERAL MEDICAL EXAMINATION: ICD-10-CM

## 2022-09-10 LAB
ALBUMIN SERPL-MCNC: 3.9 G/DL (ref 3.4–5)
ALBUMIN/GLOB SERPL: 1 {RATIO} (ref 1–2)
ALP LIVER SERPL-CCNC: 99 U/L
ALT SERPL-CCNC: 170 U/L
ANION GAP SERPL CALC-SCNC: 11 MMOL/L (ref 0–18)
AST SERPL-CCNC: 70 U/L (ref 15–37)
BASOPHILS # BLD AUTO: 0.04 X10(3) UL (ref 0–0.2)
BASOPHILS NFR BLD AUTO: 0.5 %
BILIRUB SERPL-MCNC: 1.1 MG/DL (ref 0.1–2)
BILIRUB UR QL CFM: NEGATIVE
BUN BLD-MCNC: 21 MG/DL (ref 7–18)
BUN/CREAT SERPL: 17.5 (ref 10–20)
CALCIUM BLD-MCNC: 9.6 MG/DL (ref 8.5–10.1)
CHLORIDE SERPL-SCNC: 103 MMOL/L (ref 98–112)
CHOLEST SERPL-MCNC: 238 MG/DL (ref ?–200)
CLARITY UR: CLEAR
CO2 SERPL-SCNC: 24 MMOL/L (ref 21–32)
COLOR UR: YELLOW
COMPLEXED PSA SERPL-MCNC: 1.53 NG/ML (ref ?–4)
CREAT BLD-MCNC: 1.2 MG/DL
DEPRECATED RDW RBC AUTO: 38 FL (ref 35.1–46.3)
EOSINOPHIL # BLD AUTO: 0.33 X10(3) UL (ref 0–0.7)
EOSINOPHIL NFR BLD AUTO: 4 %
ERYTHROCYTE [DISTWIDTH] IN BLOOD BY AUTOMATED COUNT: 11.5 % (ref 11–15)
EST. AVERAGE GLUCOSE BLD GHB EST-MCNC: 177 MG/DL (ref 68–126)
FASTING PATIENT LIPID ANSWER: YES
FASTING STATUS PATIENT QL REPORTED: YES
GFR SERPLBLD BASED ON 1.73 SQ M-ARVRAT: 74 ML/MIN/1.73M2 (ref 60–?)
GLOBULIN PLAS-MCNC: 3.8 G/DL (ref 2.8–4.4)
GLUCOSE BLD-MCNC: 114 MG/DL (ref 70–99)
GLUCOSE UR-MCNC: NEGATIVE MG/DL
HBA1C MFR BLD: 7.8 % (ref ?–5.7)
HCT VFR BLD AUTO: 46.4 %
HDLC SERPL-MCNC: 44 MG/DL (ref 40–59)
HGB BLD-MCNC: 15.3 G/DL
HGB UR QL STRIP.AUTO: NEGATIVE
IMM GRANULOCYTES # BLD AUTO: 0.02 X10(3) UL (ref 0–1)
IMM GRANULOCYTES NFR BLD: 0.2 %
KETONES UR-MCNC: >=160 MG/DL
LDLC SERPL CALC-MCNC: 175 MG/DL (ref ?–100)
LEUKOCYTE ESTERASE UR QL STRIP.AUTO: NEGATIVE
LYMPHOCYTES # BLD AUTO: 2.43 X10(3) UL (ref 1–4)
LYMPHOCYTES NFR BLD AUTO: 29.5 %
MCH RBC QN AUTO: 29.7 PG (ref 26–34)
MCHC RBC AUTO-ENTMCNC: 33 G/DL (ref 31–37)
MCV RBC AUTO: 90.1 FL
MONOCYTES # BLD AUTO: 0.57 X10(3) UL (ref 0.1–1)
MONOCYTES NFR BLD AUTO: 6.9 %
NEUTROPHILS # BLD AUTO: 4.86 X10 (3) UL (ref 1.5–7.7)
NEUTROPHILS # BLD AUTO: 4.86 X10(3) UL (ref 1.5–7.7)
NEUTROPHILS NFR BLD AUTO: 58.9 %
NITRITE UR QL STRIP.AUTO: NEGATIVE
NONHDLC SERPL-MCNC: 194 MG/DL (ref ?–130)
OSMOLALITY SERPL CALC.SUM OF ELEC: 290 MOSM/KG (ref 275–295)
PH UR: 5.5 [PH] (ref 5–8)
PLATELET # BLD AUTO: 210 10(3)UL (ref 150–450)
POTASSIUM SERPL-SCNC: 5 MMOL/L (ref 3.5–5.1)
PROT SERPL-MCNC: 7.7 G/DL (ref 6.4–8.2)
RBC # BLD AUTO: 5.15 X10(6)UL
SODIUM SERPL-SCNC: 138 MMOL/L (ref 136–145)
SP GR UR STRIP: 1.02 (ref 1–1.03)
TRIGL SERPL-MCNC: 105 MG/DL (ref 30–149)
TSI SER-ACNC: 0.97 MIU/ML (ref 0.36–3.74)
UROBILINOGEN UR STRIP-ACNC: 0.2
VLDLC SERPL CALC-MCNC: 21 MG/DL (ref 0–30)
WBC # BLD AUTO: 8.3 X10(3) UL (ref 4–11)

## 2022-09-10 PROCEDURE — 81003 URINALYSIS AUTO W/O SCOPE: CPT

## 2022-09-10 PROCEDURE — 83036 HEMOGLOBIN GLYCOSYLATED A1C: CPT

## 2022-09-10 PROCEDURE — 80053 COMPREHEN METABOLIC PANEL: CPT

## 2022-09-10 PROCEDURE — 36415 COLL VENOUS BLD VENIPUNCTURE: CPT

## 2022-09-10 PROCEDURE — 84443 ASSAY THYROID STIM HORMONE: CPT

## 2022-09-10 PROCEDURE — 80061 LIPID PANEL: CPT

## 2022-09-10 PROCEDURE — 85025 COMPLETE CBC W/AUTO DIFF WBC: CPT

## 2022-10-18 DIAGNOSIS — I10 ESSENTIAL HYPERTENSION: ICD-10-CM

## 2022-10-18 RX ORDER — LISINOPRIL AND HYDROCHLOROTHIAZIDE 25; 20 MG/1; MG/1
1 TABLET ORAL DAILY
Qty: 90 TABLET | Refills: 0 | Status: SHIPPED | OUTPATIENT
Start: 2022-10-18 | End: 2022-10-19

## 2022-10-19 ENCOUNTER — OFFICE VISIT (OUTPATIENT)
Dept: FAMILY MEDICINE CLINIC | Facility: CLINIC | Age: 47
End: 2022-10-19

## 2022-10-19 VITALS
HEIGHT: 72 IN | SYSTOLIC BLOOD PRESSURE: 124 MMHG | BODY MASS INDEX: 35.76 KG/M2 | WEIGHT: 264 LBS | DIASTOLIC BLOOD PRESSURE: 86 MMHG | RESPIRATION RATE: 16 BRPM | HEART RATE: 88 BPM | TEMPERATURE: 97 F

## 2022-10-19 DIAGNOSIS — I10 ESSENTIAL HYPERTENSION: ICD-10-CM

## 2022-10-19 DIAGNOSIS — E78.00 HYPERCHOLESTEROLEMIA: ICD-10-CM

## 2022-10-19 DIAGNOSIS — E66.01 CLASS 2 SEVERE OBESITY DUE TO EXCESS CALORIES WITH SERIOUS COMORBIDITY AND BODY MASS INDEX (BMI) OF 35.0 TO 35.9 IN ADULT (HCC): ICD-10-CM

## 2022-10-19 DIAGNOSIS — R79.89 ELEVATED LIVER FUNCTION TESTS: ICD-10-CM

## 2022-10-19 DIAGNOSIS — E11.9 DIABETES MELLITUS, NEW ONSET (HCC): Primary | ICD-10-CM

## 2022-10-19 RX ORDER — ROSUVASTATIN CALCIUM 5 MG/1
5 TABLET, COATED ORAL NIGHTLY
Qty: 30 TABLET | Refills: 2 | Status: SHIPPED | OUTPATIENT
Start: 2022-10-19

## 2022-10-19 RX ORDER — LISINOPRIL AND HYDROCHLOROTHIAZIDE 25; 20 MG/1; MG/1
1 TABLET ORAL DAILY
Qty: 90 TABLET | Refills: 0 | Status: SHIPPED | OUTPATIENT
Start: 2022-10-19

## 2022-10-19 NOTE — PATIENT INSTRUCTIONS
Continue lisinopril/hydrochlorothiazide. Start rosuvastatin 5 mg 1 tablet daily to bring the cholesterol levels down. Start metformin 500 mg 1 tablet twice a day to bring the sugars down. Low carbohydrate diet less bread, less pasta less rice less potatoes and sweets. Low-fat diet avoid red meat, no butter, no cheese, less egg yolks. Keep good hydration. Try to lose weight.

## 2022-11-29 DIAGNOSIS — I10 ESSENTIAL HYPERTENSION: ICD-10-CM

## 2022-11-29 RX ORDER — LISINOPRIL AND HYDROCHLOROTHIAZIDE 25; 20 MG/1; MG/1
TABLET ORAL
Qty: 90 TABLET | Refills: 0 | OUTPATIENT
Start: 2022-11-29

## 2022-11-29 NOTE — TELEPHONE ENCOUNTER
LOV: 10/19/22 (med check)   Last Refill: 10/19/22, #90, 0 RF  Next OV: 1/23/23    Too early for refill.

## 2023-01-23 ENCOUNTER — OFFICE VISIT (OUTPATIENT)
Dept: FAMILY MEDICINE CLINIC | Facility: CLINIC | Age: 48
End: 2023-01-23
Payer: COMMERCIAL

## 2023-01-23 VITALS
BODY MASS INDEX: 35.49 KG/M2 | TEMPERATURE: 98 F | HEIGHT: 72 IN | WEIGHT: 262 LBS | RESPIRATION RATE: 20 BRPM | DIASTOLIC BLOOD PRESSURE: 80 MMHG | HEART RATE: 88 BPM | SYSTOLIC BLOOD PRESSURE: 118 MMHG

## 2023-01-23 DIAGNOSIS — I10 ESSENTIAL HYPERTENSION: Primary | ICD-10-CM

## 2023-01-23 DIAGNOSIS — E11.9 DIABETES MELLITUS, NEW ONSET (HCC): ICD-10-CM

## 2023-01-23 DIAGNOSIS — E66.01 CLASS 2 SEVERE OBESITY DUE TO EXCESS CALORIES WITH SERIOUS COMORBIDITY AND BODY MASS INDEX (BMI) OF 35.0 TO 35.9 IN ADULT (HCC): ICD-10-CM

## 2023-01-23 DIAGNOSIS — Z12.11 SCREEN FOR COLON CANCER: ICD-10-CM

## 2023-01-23 DIAGNOSIS — E78.00 HYPERCHOLESTEROLEMIA: ICD-10-CM

## 2023-01-23 PROCEDURE — 3008F BODY MASS INDEX DOCD: CPT | Performed by: FAMILY MEDICINE

## 2023-01-23 PROCEDURE — 3079F DIAST BP 80-89 MM HG: CPT | Performed by: FAMILY MEDICINE

## 2023-01-23 PROCEDURE — 3074F SYST BP LT 130 MM HG: CPT | Performed by: FAMILY MEDICINE

## 2023-01-23 PROCEDURE — 99214 OFFICE O/P EST MOD 30 MIN: CPT | Performed by: FAMILY MEDICINE

## 2023-01-23 RX ORDER — ROSUVASTATIN CALCIUM 5 MG/1
5 TABLET, COATED ORAL NIGHTLY
Qty: 90 TABLET | Refills: 0 | Status: SHIPPED | OUTPATIENT
Start: 2023-01-23

## 2023-01-23 RX ORDER — LISINOPRIL AND HYDROCHLOROTHIAZIDE 25; 20 MG/1; MG/1
1 TABLET ORAL DAILY
Qty: 90 TABLET | Refills: 1 | Status: SHIPPED | OUTPATIENT
Start: 2023-01-23

## 2023-01-24 NOTE — PATIENT INSTRUCTIONS
Continue current meds. Watch diet for fats and carbs. Stay active. Fasting blood work. Schedule eye examination with ophthalmologist.    Further recommendations pending test results.

## 2023-05-04 DIAGNOSIS — E11.9 DIABETES MELLITUS, NEW ONSET (HCC): ICD-10-CM

## 2023-05-04 DIAGNOSIS — E78.00 HYPERCHOLESTEROLEMIA: Primary | ICD-10-CM

## 2023-05-04 RX ORDER — ROSUVASTATIN CALCIUM 5 MG/1
5 TABLET, COATED ORAL NIGHTLY
Qty: 30 TABLET | Refills: 1 | Status: SHIPPED | OUTPATIENT
Start: 2023-05-04

## 2023-06-13 ENCOUNTER — TELEPHONE (OUTPATIENT)
Dept: ADMINISTRATIVE | Age: 48
End: 2023-06-13

## 2023-06-13 DIAGNOSIS — Q82.5 BIRTH MARK: Primary | ICD-10-CM

## 2023-06-13 DIAGNOSIS — Z12.83 SKIN CANCER SCREENING: ICD-10-CM

## 2023-06-13 NOTE — TELEPHONE ENCOUNTER
Call to patient with [facility]  Cassius Claudio (ER [id number]). No answer. Left voice message to call back.

## 2023-06-16 NOTE — TELEPHONE ENCOUNTER
S/w Roney. Informed him that derm referral has been signed. He is asking for a Saturday appt. Can we offer him Saturday 08/05/23?

## 2023-06-16 NOTE — TELEPHONE ENCOUNTER
That would be okay. If he cannot make it give us 24 hours advance notice we can use appointment for someone else. Thanks.

## 2023-09-29 DIAGNOSIS — E11.9 DIABETES MELLITUS, NEW ONSET (HCC): ICD-10-CM

## 2023-09-29 DIAGNOSIS — E78.00 HYPERCHOLESTEROLEMIA: ICD-10-CM

## 2023-09-29 NOTE — TELEPHONE ENCOUNTER
LOV:  01/23/2023 for: Medication Follow-Up   Patient advised to RTC on:  PRN    Medication Quantity Refills Start End   metFORMIN 500 MG Oral Tab 60 tablet 1 5/4/2023    Sig:   Take 1 tablet (500 mg total) by mouth 2 (two) times daily. Do fasting blood work prior office visit in June. Medication Quantity Refills Start End   rosuvastatin 5 MG Oral Tab 30 tablet 1 5/4/2023    Sig:   Take 1 tablet (5 mg total) by mouth nightly. Schedule follow-up office visit for June.

## 2023-10-03 RX ORDER — ROSUVASTATIN CALCIUM 5 MG/1
5 TABLET, COATED ORAL NIGHTLY
Qty: 30 TABLET | Refills: 0 | OUTPATIENT
Start: 2023-10-03

## 2023-10-03 NOTE — TELEPHONE ENCOUNTER
Please have the patient set up an appointment. We can have him to see Teresita Land- let pt  know  she speaks [place]. After we have an appointment we can call 1 month supply  medication until his visit. Thank you.

## 2023-10-03 NOTE — TELEPHONE ENCOUNTER
LOV 1/23/23 for HTN    Prior to calling pt, please advise if ok to schedule in Jan 2024 or if sooner apt needed?

## 2023-11-09 ENCOUNTER — TELEPHONE (OUTPATIENT)
Dept: FAMILY MEDICINE CLINIC | Facility: CLINIC | Age: 48
End: 2023-11-09

## 2023-11-09 NOTE — TELEPHONE ENCOUNTER
Pt has been having stomach pain x 1 week. Also heartburn. It has been constant. Wife would like to speak with a nurse. Thank you.

## 2023-11-09 NOTE — TELEPHONE ENCOUNTER
I just saw message. Since the pain is quite severe patient will need a imaging test he will need a CT scan and blood work. He might have acute appendicitis diverticulitis or small bowel obstruction, hard to predict without evaluation. I agree with ER recommendation. He should go to emergency room today for evaluation. I do not have openings tomorrow. We could have a cancellation,  but it is hard to predict. Barbara Ferris is working with me under my supervision so if you would not go to emergency room she can see him,  but again he will need a imaging test and blood work and since the pain is severe we need to advise him to go to the ER today. Not being evaluated today would be AGAINST MEDICAL ADVICE.

## 2023-11-09 NOTE — TELEPHONE ENCOUNTER
I called the patient and spoke to the pt.s spouse. She was given all of the instructions from Dr. Leonidas Akbar. She made an appt for her  to see Emi Braun tomorrow morning at 7:00 am. I explained to her he is still going to probably need imaging and labs. It is preferred he goes to the ER today to be evaluated and if they choose to not do that it is against medical advice. Spouse verbalized understanding.

## 2023-11-09 NOTE — TELEPHONE ENCOUNTER
S/w pt wife on HIPAA  Reports pt has had RLQ pain x a week, mostly when eating  Reports pain is constant but worse when eating  Sts feels heartburn after eating  No fever  No n/v/d  Denies feeling constipated but said \"stomach feels like a rock\"  Reports pain is sharp at times when he eats something  Not much of appetite  Reports pain was very bad last night, almost went to ER  Woke up feeling better this am but \"still very uncomfortable\"    Asking to see Dr Pedro Aldrich for this  Pt is a , at work currently. Advised Dr is out of ofc, offered to send to on call as well. Discussed urgent care possibly today.  Prefers to see Dr Lenora Smith (I suggested possibly deshawn tomorrow but \"wants to see an MD even if not Dr Lenora Smith")    She sts \"if he is getting worse, I will take him to ER\"    Please advise thx

## 2023-11-09 NOTE — TELEPHONE ENCOUNTER
I called pt.s spouse Keisha. I informed her of Dr. Harris Casillas  instruction for Pt to go to the ER today for evaluation of the RLQ abd pain. Pt.s spouse refused to have him go to the ER. She stated \" every time I call e are sent to the ER. My insurance filed a complaint against me for going to the ER so much. \" I explained to her it really depends on the nature of the call and symptoms of the patient if the provider recommends the ER. In Roney's situation his symptoms do warrant an ER visit. If they choose to not go to the ER today for an evaluation it is against medical advice. Spouse stated \"if he gets bad again I will take him to the ER but I want an appointment with only Dr. Sergio Powers. \" I informed her Dr. Sergio Powers is off today. I will have to send her a message to see when she can see pt. Spouse still upset because she can never see Dr. Sergio Powers. I informed her a message will be sent to her but she may not hear back until Friday 11/10/23. The recommendation still stands that pt should go to the ER today for evaluation. If they choose to not go it is against medical advice. Spouse verbalized understanding.

## 2023-11-10 ENCOUNTER — TELEPHONE (OUTPATIENT)
Dept: FAMILY MEDICINE CLINIC | Facility: CLINIC | Age: 48
End: 2023-11-10

## 2023-11-10 NOTE — TELEPHONE ENCOUNTER
Pt had 7am appointment today but had not shown up by 7:15am. Per Radha's request, Pt was contacted to confirm if he is enroute. Pt stated that he told \"nurse\" he spoke with yesterday that he could not make the appointment. Pt was asked if he could come at any time today and he said no because he is still working. Pt was told due to his complaints/symptoms that we recommend that he go to the ER if he is still experiencing them. Pt say yes. I repeated that we recommend going and asked if he understood. Pt again said yes and then hung up before I could say anything else.

## 2023-11-19 DIAGNOSIS — I10 ESSENTIAL HYPERTENSION: ICD-10-CM

## 2023-11-19 DIAGNOSIS — E11.9 DIABETES MELLITUS, NEW ONSET (HCC): Primary | ICD-10-CM

## 2023-11-19 DIAGNOSIS — E78.00 HYPERCHOLESTEROLEMIA: ICD-10-CM

## 2023-11-20 RX ORDER — LISINOPRIL AND HYDROCHLOROTHIAZIDE 25; 20 MG/1; MG/1
1 TABLET ORAL DAILY
Qty: 30 TABLET | Refills: 0 | Status: SHIPPED | OUTPATIENT
Start: 2023-11-20

## 2023-11-20 NOTE — TELEPHONE ENCOUNTER
Last office visit: 1/23/2023  Next office visit: none  Last refill: 1/23/2023  lisinopril-hydroCHLOROthiazide 20-25 MG Oral Tab 90 tablet 1 1/23/2023    Sig:   Take 1 tablet by mouth daily. Route:   Oral       Protocol:  Hypertension Medications Protocol [id number]/19/2023 01:30 AM   Protocol Details CMP or BMP in past 12 months    Appointment in past 6 or next 3 months    Last serum creatinine< 2.0     Please review and refill if appropriate, thank you!

## 2023-11-21 NOTE — TELEPHONE ENCOUNTER
Patient refill for medication was called in. Patient needs medication check. please set up an appointment. Patient needs a blood work for lipid panel CMP hemoglobin A1c urine microalbumin. Okay to schedule with Dimitrios Jeter. Please let us know if she speaks [place] and she can see him sooner next visit he can schedule with me next year.   Thank you

## 2023-11-28 ENCOUNTER — TELEPHONE (OUTPATIENT)
Dept: FAMILY MEDICINE CLINIC | Facility: CLINIC | Age: 48
End: 2023-11-28

## 2023-11-28 ENCOUNTER — HOSPITAL ENCOUNTER (EMERGENCY)
Facility: HOSPITAL | Age: 48
Discharge: HOME | End: 2023-11-28
Attending: EMERGENCY MEDICINE
Payer: COMMERCIAL

## 2023-11-28 VITALS
SYSTOLIC BLOOD PRESSURE: 125 MMHG | DIASTOLIC BLOOD PRESSURE: 104 MMHG | WEIGHT: 250 LBS | TEMPERATURE: 98 F | HEART RATE: 100 BPM | RESPIRATION RATE: 18 BRPM | HEIGHT: 72 IN | OXYGEN SATURATION: 98 % | BODY MASS INDEX: 33.86 KG/M2

## 2023-11-28 DIAGNOSIS — J11.1 INFLUENZA: Primary | ICD-10-CM

## 2023-11-28 LAB
FLUAV + FLUBV RNA SPEC NAA+PROBE: NEGATIVE
FLUAV + FLUBV RNA SPEC NAA+PROBE: POSITIVE
RSV RNA SPEC NAA+PROBE: NEGATIVE
SARS-COV-2 RNA RESP QL NAA+PROBE: NOT DETECTED

## 2023-11-28 PROCEDURE — 0241U SARS-COV-2/FLU A AND B/RSV BY PCR (GENEXPERT): CPT

## 2023-11-28 PROCEDURE — 99283 EMERGENCY DEPT VISIT LOW MDM: CPT

## 2023-11-28 PROCEDURE — 0241U SARS-COV-2/FLU A AND B/RSV BY PCR (GENEXPERT): CPT | Performed by: EMERGENCY MEDICINE

## 2023-11-28 RX ORDER — OSELTAMIVIR PHOSPHATE 75 MG/1
75 CAPSULE ORAL 2 TIMES DAILY
Qty: 10 CAPSULE | Refills: 0 | Status: SHIPPED | OUTPATIENT
Start: 2023-11-28 | End: 2023-12-03

## 2023-11-28 NOTE — TELEPHONE ENCOUNTER
Unfortunately I do not have any openings but I think that should be seen sooner than later. Please offer patient appointment to see Roderick Zapata at this time for evaluation of her acute symptoms. Please verify if patient did COVID test at home. If not that could be done in the office. Roderick Zapata works under my supervision she speaks [place]. I would also suggest to set up an appointment with me for his diabetes we may schedule at next available.   Thank you

## 2023-11-28 NOTE — TELEPHONE ENCOUNTER
Pt requesting apt ASAP for sick symptoms. Symptoms: Cough, fatigue/weakness, body aches, fever    Symptoms onset: 11/25    Pt states recently returned from [place]. No Covid test completed since onset of symptoms.     Please advise if /when to schedule    *See also TE for spouse*

## 2023-11-29 NOTE — DISCHARGE INSTRUCTIONS
Rest at home  Motrin or tylenol for symptoms at home  Encourage fluids at home  Return to the ER if symptoms worsen or if any other problems arise

## 2023-12-18 ENCOUNTER — TELEPHONE (OUTPATIENT)
Dept: FAMILY MEDICINE CLINIC | Facility: CLINIC | Age: 48
End: 2023-12-18

## 2023-12-21 DIAGNOSIS — E78.00 HYPERCHOLESTEROLEMIA: ICD-10-CM

## 2023-12-21 DIAGNOSIS — E11.9 DIABETES MELLITUS, NEW ONSET (HCC): ICD-10-CM

## 2023-12-22 NOTE — TELEPHONE ENCOUNTER
Requested Prescriptions     Pending Prescriptions Disp Refills    METFORMIN 500 MG Oral Tab [Pharmacy Med Name: Metformin Hydrochloride 500 Mg Tab Auro] 60 tablet 0     Sig: Take 1 tablet (500 mg total) by mouth 2 (two) times daily. Do fasting blood work prior office visit in June for additional refills    ROSUVASTATIN 5 MG Oral Tab [Pharmacy Med Name: Rosuvastatin Calcium 5 Mg Tab Nort] 30 tablet 0     Sig: Take 1 tablet (5 mg total) by mouth nightly. Schedule follow-up office visit for June.     Last refill 5/4/23 #30 day x 1 refill  LOV 1/23/23  No future appointments.  Patient is due for appointment and labs - reminder letter sent.  #30 pended for approval

## 2023-12-22 NOTE — TELEPHONE ENCOUNTER
Patient needs office visit on blood test please have him to set up an appointment.  He can see Radha if it is more convenient for him since she can work him  in sooner.Thanks.

## 2024-01-05 DIAGNOSIS — E11.9 DIABETES MELLITUS, NEW ONSET (HCC): ICD-10-CM

## 2024-01-05 DIAGNOSIS — E78.00 HYPERCHOLESTEROLEMIA: ICD-10-CM

## 2024-01-11 RX ORDER — ROSUVASTATIN CALCIUM 5 MG/1
5 TABLET, COATED ORAL NIGHTLY
Qty: 30 TABLET | Refills: 1 | OUTPATIENT
Start: 2024-01-11

## 2024-01-11 NOTE — TELEPHONE ENCOUNTER
Patient needs an appointment last visit was a year ago.  Please offer him appointment with Radha she can work him in sooner.  Thank you

## 2024-01-11 NOTE — TELEPHONE ENCOUNTER
Pt has not responded to multiple appointment requests since 9/2023.    Please advise how to proceed?

## 2024-01-11 NOTE — TELEPHONE ENCOUNTER
Requested Prescriptions     Pending Prescriptions Disp Refills    metFORMIN 500 MG Oral Tab 60 tablet 1     Sig: Take 1 tablet (500 mg total) by mouth 2 (two) times daily. Do fasting blood work prior office visit in June.    rosuvastatin 5 MG Oral Tab 30 tablet 1     Sig: Take 1 tablet (5 mg total) by mouth nightly. Schedule follow-up office visit for June.     Last office visit 1/23/23  No future appointments.  Patient is due for office visit - routing to front office to assist with scheduling.

## 2024-01-15 ENCOUNTER — TELEPHONE (OUTPATIENT)
Dept: FAMILY MEDICINE CLINIC | Facility: CLINIC | Age: 49
End: 2024-01-15

## 2024-01-15 NOTE — TELEPHONE ENCOUNTER
The reason for the recommendation is due to delay of results when ordered at our office at 6PM, as he is having acute symptoms of productive cough, fevers, and body aches. Our lab is closed at that time. Our xray dept is closed at that time.   We do not have stat test for flu.  does.     Based on symptoms reported it sounds like he has new symptoms which could be indicative of a new infection such as pneumonia which calls for an xray.     He can go to [facility] and have everything completed on site, or wait to be seen tomorrow morning so that appropriate testing could be ordered for him

## 2024-01-15 NOTE — TELEPHONE ENCOUNTER
PT's wife Keisha called concerned about Roney not getting better from being sick in November.    SX include:    Cough with green mucus  Cough is ongoing  Body aches  Sore throat  SOB at night from coughing  Had fever on Saturday      Had the influenza in November and went to ED and got   oseltamivir (TAMIFLU) 75 MG Oral Cap () 10     Complete all. Not better. I reached out to Dr. GANNON and she stated to put him in today with Radha but he's not close by. Radha states to triage patient.  TY

## 2024-01-15 NOTE — TELEPHONE ENCOUNTER
LOV: 1/23/23 Greg    Called patient's spouse and informed of provider's recommendations below to go to UC today and reminded to schedule greg.   She voiced out frustration that our office is not able to see patient today. She states her  refuses to go anywhere and see a different doctor. She voiced frustration that every time they call and ask for evaluation they were always told to go to UC/ER.     Informed her I am only relaying recommendations form the provider. I informed her that given reported symptoms patient needs evaluations/treatment today and if they choose not to go, it will be against medical advice. She voiced understanding. She states Pt prefers to be seen in the office tomorrow or whenever there is opening.  Please advise. Thanks.

## 2024-01-15 NOTE — TELEPHONE ENCOUNTER
Called and informed patient's spouse of recommendations below. Strongly advised to go to UC for immediate evaluation today. Pt's spouse still voiced out frustration and refused UC evaluation. She states pt refuses to go so she wants to schedule him for tomorrow instead.    Scheduled to see Radha PIMENTEL at 8AM tomorrow 1/16/24 for evaluation. Advised to go to UC/ER if any worsening symptoms overnight.  She voiced understanding.

## 2024-01-15 NOTE — TELEPHONE ENCOUNTER
Please have him go to  today for testing as they can complete rapid testing for covid and flu.  Will likely need chest xray to rule out pneumonia due to fevers, body aches, and productive cough   Have him schedule medication follow up

## 2024-01-15 NOTE — TELEPHONE ENCOUNTER
Called patient's spouse Keisha (OK per HIPAA).  She reports patient tested positive for Flu during ER visit 11/28/23, and was prescribed with Tamiflu. She states medication was working in the beginning but symptoms never went away.   She states pt still has productive cough with green phlegm, body aches, fever (Saturday 1/13/24 resolved), sore throat and SOB only at night when coughing. She states Pt denies SOB, chest pain, palpitations at this time.  She states Pt's symptoms are not worsneing but persistent.    She states Pt takes Tylenol PRN Q4h for pain/fever management.    She states Pt is a  and is out of [place], so he is not able to come until later today 5:30-6PM.     Routing to Radha PIMENTEL. Are you okay seeing patient today in the office? Or UC/ER? Please advise, Thanks.

## 2024-01-16 ENCOUNTER — TELEPHONE (OUTPATIENT)
Dept: FAMILY MEDICINE CLINIC | Facility: CLINIC | Age: 49
End: 2024-01-16

## 2024-01-16 NOTE — TELEPHONE ENCOUNTER
Patient had 8:00 appointment.  Has not checked in yet.  Left vm asking if he is coming to appt.    This would be 2nd No Show for patient.    Asked him to call the .

## 2024-01-17 DIAGNOSIS — E78.00 HYPERCHOLESTEROLEMIA: ICD-10-CM

## 2024-01-17 DIAGNOSIS — I10 ESSENTIAL HYPERTENSION: ICD-10-CM

## 2024-01-17 DIAGNOSIS — E11.9 DIABETES MELLITUS, NEW ONSET (HCC): ICD-10-CM

## 2024-02-01 ENCOUNTER — MED REC SCAN ONLY (OUTPATIENT)
Dept: FAMILY MEDICINE CLINIC | Facility: CLINIC | Age: 49
End: 2024-02-01

## 2024-02-02 RX ORDER — ROSUVASTATIN CALCIUM 5 MG/1
5 TABLET, COATED ORAL NIGHTLY
Qty: 30 TABLET | Refills: 0 | OUTPATIENT
Start: 2024-02-02

## 2024-02-02 RX ORDER — LISINOPRIL AND HYDROCHLOROTHIAZIDE 25; 20 MG/1; MG/1
1 TABLET ORAL DAILY
Qty: 30 TABLET | Refills: 0 | OUTPATIENT
Start: 2024-02-02

## 2024-03-06 ENCOUNTER — PATIENT OUTREACH (OUTPATIENT)
Dept: FAMILY MEDICINE CLINIC | Facility: CLINIC | Age: 49
End: 2024-03-06

## 2024-03-06 NOTE — PROGRESS NOTES
Pt is due for B.P check / annual physical.  Please call him to schedule an appointment.  Thank you!

## 2024-05-24 ENCOUNTER — TELEPHONE (OUTPATIENT)
Dept: FAMILY MEDICINE CLINIC | Facility: CLINIC | Age: 49
End: 2024-05-24

## 2024-07-10 ENCOUNTER — TELEPHONE (OUTPATIENT)
Dept: FAMILY MEDICINE CLINIC | Facility: CLINIC | Age: 49
End: 2024-07-10

## 2024-07-10 ENCOUNTER — OFFICE VISIT (OUTPATIENT)
Dept: FAMILY MEDICINE CLINIC | Facility: CLINIC | Age: 49
End: 2024-07-10
Payer: COMMERCIAL

## 2024-07-10 VITALS
BODY MASS INDEX: 36.03 KG/M2 | HEIGHT: 72.01 IN | HEART RATE: 92 BPM | DIASTOLIC BLOOD PRESSURE: 80 MMHG | SYSTOLIC BLOOD PRESSURE: 148 MMHG | WEIGHT: 266 LBS | RESPIRATION RATE: 18 BRPM | TEMPERATURE: 98 F

## 2024-07-10 DIAGNOSIS — I10 ESSENTIAL HYPERTENSION: Primary | ICD-10-CM

## 2024-07-10 DIAGNOSIS — E11.9 DIABETES MELLITUS, NEW ONSET (HCC): ICD-10-CM

## 2024-07-10 DIAGNOSIS — E66.01 CLASS 2 SEVERE OBESITY DUE TO EXCESS CALORIES WITH SERIOUS COMORBIDITY AND BODY MASS INDEX (BMI) OF 35.0 TO 35.9 IN ADULT (HCC): ICD-10-CM

## 2024-07-10 DIAGNOSIS — E78.00 HYPERCHOLESTEROLEMIA: ICD-10-CM

## 2024-07-10 DIAGNOSIS — Z12.11 SCREEN FOR COLON CANCER: ICD-10-CM

## 2024-07-10 PROBLEM — E11.65 TYPE 2 DIABETES MELLITUS WITH HYPERGLYCEMIA, WITHOUT LONG-TERM CURRENT USE OF INSULIN (HCC): Status: ACTIVE | Noted: 2024-07-10

## 2024-07-10 PROCEDURE — G2211 COMPLEX E/M VISIT ADD ON: HCPCS

## 2024-07-10 PROCEDURE — 99214 OFFICE O/P EST MOD 30 MIN: CPT

## 2024-07-10 RX ORDER — LISINOPRIL AND HYDROCHLOROTHIAZIDE 25; 20 MG/1; MG/1
1 TABLET ORAL DAILY
Qty: 90 TABLET | Refills: 0 | Status: SHIPPED | OUTPATIENT
Start: 2024-07-10

## 2024-07-10 RX ORDER — ROSUVASTATIN CALCIUM 5 MG/1
5 TABLET, COATED ORAL NIGHTLY
Qty: 90 TABLET | Refills: 0 | Status: SHIPPED | OUTPATIENT
Start: 2024-07-10

## 2024-07-10 NOTE — TELEPHONE ENCOUNTER
Please offer patient an appointment with Radha this week.  He can get refill of his meds.  I do have an opening at 1:30 PM next Wednesday but he probably should be seen sooner.  Thank you   REVIEW OF SYSTEMS:    GENERAL:  Patient denies fever, chills, tiredness, malaise.  EYES:  Patient denies blurred vision, double vision, pain, burning and itching.   NEUROLOGIC:  Patient denies tremors, dizzy spells, numbness, tingling, vision change, loss of balance.  ENDOCRINE:  Patient denies excessive thirst, heat intolerance, lymphnode enlargement.  GASTROINTESTINAL:  Patient denies abdominal pain, nausea/vomiting, indigestion/heartburn, diarrhea, constipation.  CARDIOVASCUALR:  Patient denies chest pain, varicose veins, high blood pressure.  SKIN:  Patient denies skin rashes, boils, persistent itching, acne.  MUSCULOSKELETAL:  Patient denies joint pain, neck pain, back pain, leg swelling.  ENT/MOUTH:  Patient denies ear infections, sore throats, sinus problems, hearing loss.  RESPIRATORY:  Patient denies wheezing, frequent cough, shortness of breath.   :  Patient denies urine retention, painful urination, urinary frequency, blood in urine, nocturia.  HEME/LYMPHATICE:  Patient denies swollen glands, blood clotting problems.   PSYCHOLOGICAL:  Patient denies anxiety, depression.

## 2024-07-10 NOTE — PATIENT INSTRUCTIONS
Complete fasting blood work  Schedule appointment with ophthalmology  Schedule your colonoscopy  Establish care with a primary care provider in [place] once you move  Low carb, low fat diet  Watch intake of salt  Regular exercise    Monitor your blood pressure daily for the next week. Send me your blood pressure readings in 1 week

## 2024-07-10 NOTE — TELEPHONE ENCOUNTER
Unfortunately would not have an appointment at this time.  The patient does not want to see Radha we would have to wait for cancellation we will call him if we have anything open.  Thank you

## 2024-07-10 NOTE — TELEPHONE ENCOUNTER
Pt wife calling requesting appointment as soon as possible with Dr Hill or any available provider.    States pt was out of the country for a while due to a family emergency and during that time did not take BP and diabetic medication.    Wife states pt is out of medication, would like to schedule appointment to follow up and get refills.    Please advise if/when to schedule?

## 2024-07-10 NOTE — PROGRESS NOTES
[facility] Family Medicine Office Note  Chief Complaint:   Chief Complaint   Patient presents with    Medication Follow-Up     Prescription refill       HPI:   This is a 49 year old male coming in for medication follow up. He is diagnosed with essential hypertension, hypercholesterolemia, and type 2 diabetes. He has not taken his medication for the past few months. Last office visit was 2023. Reports he is not monitoring his blood pressure at home or watching his diet closely. He does exercise a few days per week riding his exercise bike and walking. Denies chest pain, shortness of breath, palpitations, or headaches.     Patient states he will permanently be moving to [place] in 2 months with wife and kids; near [facility].     Due for colonoscopy and diabetic eye examination. Patient would like to try and have both completed in [place].     Past Medical History:    Diabetes (HCC)    Hyperlipidemia    Unspecified essential hypertension     Past Surgical History:   Procedure Laterality Date    Tonsillectomy       Social History:  Social History     Socioeconomic History    Marital status:    Tobacco Use    Smoking status: Former     Current packs/day: 0.00     Types: Cigarettes     Start date: 2001     Quit date: 2019     Years since quittin.4    Smokeless tobacco: Never   Vaping Use    Vaping status: Never Used   Substance and Sexual Activity    Alcohol use: Yes     Comment: socially    Drug use: No    Sexual activity: Yes     Partners: Female   Other Topics Concern    Caffeine Concern No    Exercise No    Seat Belt Yes     Family History:  Family History   Problem Relation Age of Onset    Cancer Father         colon ca at 66    Colon Cancer Father 64    Hypertension Mother      Allergies:  No Known Allergies  Current Meds:  Current Outpatient Medications   Medication Sig Dispense Refill    lisinopril-hydroCHLOROthiazide 20-25 MG Oral Tab Take 1 tablet by mouth daily. 90 tablet  0    metFORMIN 500 MG Oral Tab Take 1 tablet (500 mg total) by mouth 2 (two) times daily. 180 tablet 0    rosuvastatin 5 MG Oral Tab Take 1 tablet (5 mg total) by mouth nightly. 90 tablet 0      Counseling given: Not Answered       REVIEW OF SYSTEMS:   Review of Systems   Constitutional: Negative.    HENT: Negative.     Eyes: Negative.    Respiratory: Negative.     Cardiovascular: Negative.    Gastrointestinal: Negative.    Endocrine: Negative.    Genitourinary: Negative.    Musculoskeletal: Negative.    Skin: Negative.    Allergic/Immunologic: Negative.    Neurological: Negative.    Hematological: Negative.    Psychiatric/Behavioral: Negative.           EXAM:   /80   Pulse 92   Temp 98.1 °F (36.7 °C)   Resp 18   Ht 6' 0.01\" (1.829 m)   Wt 266 lb (120.7 kg)   BMI 36.07 kg/m²  Estimated body mass index is 36.07 kg/m² as calculated from the following:    Height as of this encounter: 6' 0.01\" (1.829 m).    Weight as of this encounter: 266 lb (120.7 kg).   Vital signs reviewed.Appears stated age, well groomed.  GENERAL: well developed, well nourished, in no apparent distress  SKIN: no rashes, no suspicious lesions   HEENT: atraumatic, normocephalic, ears and throat are clear. Normal TM's.  EYES:PERRLA, EOMI,conjunctiva are clear.  CHEST: no chest tenderness  LUNGS: clear to auscultation  CARDIO: RRR. Normal S1 and S2. No murmur, rub, or gallop.   GI: Soft, non-distended, non-tender. Good bowel sounds. No palpable masses. No HSM.   MUSCULOSKELETAL: No obvious joint swelling or deformity.   EXTREMITIES: no cyanosis, clubbing or edema.   Bilateral barefoot skin diabetic exam is normal, visualized feet and the appearance is normal. Bilateral monofilament/sensation of both feet is normal.  NEURO: Oriented times three,cranial nerves are intact,motor and sensory are grossly intact    ASSESSMENT AND PLAN:     1. Essential hypertension  - Comp Metabolic Panel (14) [E]; Future  - lisinopril-hydroCHLOROthiazide 20-25  MG Oral Tab; Take 1 tablet by mouth daily.  Dispense: 90 tablet; Refill: 0  - CBC W Differential W Platelet [E]; Future    2. Diabetes mellitus, new onset (HCC)  - Comp Metabolic Panel (14) [E]; Future  - Hemoglobin A1C [E]; Future  - Microalb/Creat Ratio, Random Urine [E]; Future  - Refer to Opthalmology  - metFORMIN 500 MG Oral Tab; Take 1 tablet (500 mg total) by mouth 2 (two) times daily.  Dispense: 180 tablet; Refill: 0    3. Hypercholesterolemia  - Comp Metabolic Panel (14) [E]; Future  - Lipid Panel [E]; Future  - rosuvastatin 5 MG Oral Tab; Take 1 tablet (5 mg total) by mouth nightly.  Dispense: 90 tablet; Refill: 0  - CBC W Differential W Platelet [E]; Future    4. Class 2 severe obesity due to excess calories with serious comorbidity and body mass index (BMI) of 35.0 to 35.9 in adult (HCC)  - Comp Metabolic Panel (14) [E]; Future  - Lipid Panel [E]; Future  - Hemoglobin A1C [E]; Future    5. Screen for colon cancer  - Gastro Referral - In Network    Complete fasting blood work  Schedule appointment with ophthalmology   Schedule your colonoscopy  Establish care with a primary care provider in [place] once you move  Low carb, low fat diet  Watch intake of salt  Regular exercise    Monitor your blood pressure daily for the next week. Send me your blood pressure readings in 1 week     Return in about 2 weeks (around 7/24/2024) for BP follow up; bring monitor and readings to this visit.    Meds & Refills for this Visit:  Requested Prescriptions     Signed Prescriptions Disp Refills    lisinopril-hydroCHLOROthiazide 20-25 MG Oral Tab 90 tablet 0     Sig: Take 1 tablet by mouth daily.    metFORMIN 500 MG Oral Tab 180 tablet 0     Sig: Take 1 tablet (500 mg total) by mouth 2 (two) times daily.    rosuvastatin 5 MG Oral Tab 90 tablet 0     Sig: Take 1 tablet (5 mg total) by mouth nightly.       Health Maintenance:  Health Maintenance Due   Topic Date Due    Diabetes Care Dilated Eye Exam  Never done    Diabetes  Care: Microalb/Creat Ratio  Never done    Annual Physical  Never done    Pneumococcal Vaccine: Birth to 64yrs (1 of 2 - PCV) Never done    DTaP,Tdap,and Td Vaccines (1 - Tdap) Never done    Diabetes Care A1C  03/10/2023    Zoster Vaccines (1 of 2) Never done    COVID-19 Vaccine (3 - 2023-24 season) 09/01/2023    Diabetes Care: GFR  09/10/2023    Colorectal Cancer Screening  12/12/2023    Annual Depression Screening  01/01/2024    Diabetes Care Foot Exam  01/23/2024       Patient/Caregiver Education: Patient/Caregiver Education: There are no barriers to learning. Medical education done.   Outcome: Patient verbalizes understanding. Patient is notified to call with any questions, complications, allergies, or worsening or changing symptoms.  Patient is to call with any side effects or complications from the treatments as a result of today.     Problem List:  Patient Active Problem List   Diagnosis    Acute sinusitis, unspecified    Essential hypertension    Numbness and tingling in right hand    Hypercholesteremia    Hyperglycemia    Cough    Nasal congestion    Acute pharyngitis    Type 2 diabetes mellitus with hyperglycemia, without long-term current use of insulin ([facility])       Radha Mejia, APRN    Please note that portions of this note may have been completed with a voice recognition program. Efforts were made to edit the dictations but occasionally words are mis-transcribed.    The 21st Century Cures Act makes medical notes like these available to patients in the interest of transparency. Please be advised this is a medical document. Medical documents are intended to carry relevant information, facts as evident, and the clinical opinion of the practitioner. The medical note is intended as peer to peer communication and may appear blunt or direct. It is written in medical language and may contain abbreviations or verbiage that are unfamiliar.

## 2024-07-10 NOTE — TELEPHONE ENCOUNTER
Called patient and informed of Dr. Hill's recommendation below. Patient voiced understanding and agreed with plan of care. Patient is willing to wait for cancellation.

## 2024-07-24 ENCOUNTER — LAB ENCOUNTER (OUTPATIENT)
Dept: LAB | Age: 49
End: 2024-07-24
Attending: FAMILY MEDICINE
Payer: COMMERCIAL

## 2024-07-24 ENCOUNTER — OFFICE VISIT (OUTPATIENT)
Dept: FAMILY MEDICINE CLINIC | Facility: CLINIC | Age: 49
End: 2024-07-24
Payer: COMMERCIAL

## 2024-07-24 VITALS
SYSTOLIC BLOOD PRESSURE: 128 MMHG | DIASTOLIC BLOOD PRESSURE: 82 MMHG | BODY MASS INDEX: 35.62 KG/M2 | WEIGHT: 263 LBS | RESPIRATION RATE: 18 BRPM | HEART RATE: 74 BPM | HEIGHT: 72.01 IN | TEMPERATURE: 98 F

## 2024-07-24 DIAGNOSIS — R80.9 MICROALBUMINURIA: ICD-10-CM

## 2024-07-24 DIAGNOSIS — E66.01 CLASS 2 SEVERE OBESITY DUE TO EXCESS CALORIES WITH SERIOUS COMORBIDITY AND BODY MASS INDEX (BMI) OF 35.0 TO 35.9 IN ADULT (HCC): ICD-10-CM

## 2024-07-24 DIAGNOSIS — I10 ESSENTIAL HYPERTENSION: Primary | ICD-10-CM

## 2024-07-24 DIAGNOSIS — M79.642 PAIN OF LEFT HAND: ICD-10-CM

## 2024-07-24 DIAGNOSIS — E11.65 TYPE 2 DIABETES MELLITUS WITH HYPERGLYCEMIA, WITHOUT LONG-TERM CURRENT USE OF INSULIN (HCC): ICD-10-CM

## 2024-07-24 DIAGNOSIS — R20.2 NUMBNESS AND TINGLING IN LEFT HAND: ICD-10-CM

## 2024-07-24 DIAGNOSIS — E78.00 HYPERCHOLESTEROLEMIA: ICD-10-CM

## 2024-07-24 DIAGNOSIS — E11.9 DIABETES MELLITUS, NEW ONSET (HCC): ICD-10-CM

## 2024-07-24 DIAGNOSIS — R74.8 ELEVATED SERUM ALKALINE PHOSPHATASE LEVEL: ICD-10-CM

## 2024-07-24 DIAGNOSIS — I10 ESSENTIAL HYPERTENSION: ICD-10-CM

## 2024-07-24 DIAGNOSIS — R20.0 NUMBNESS AND TINGLING IN LEFT HAND: ICD-10-CM

## 2024-07-24 LAB
ALBUMIN SERPL-MCNC: 4.6 G/DL (ref 3.2–4.8)
ALBUMIN/GLOB SERPL: 1.5 {RATIO} (ref 1–2)
ALP LIVER SERPL-CCNC: 127 U/L
ALT SERPL-CCNC: 37 U/L
ANION GAP SERPL CALC-SCNC: 2 MMOL/L (ref 0–18)
AST SERPL-CCNC: 25 U/L (ref ?–34)
BASOPHILS # BLD AUTO: 0.06 X10(3) UL (ref 0–0.2)
BASOPHILS NFR BLD AUTO: 0.6 %
BILIRUB SERPL-MCNC: 0.5 MG/DL (ref 0.3–1.2)
BUN BLD-MCNC: 17 MG/DL (ref 9–23)
CALCIUM BLD-MCNC: 10.3 MG/DL (ref 8.7–10.4)
CHLORIDE SERPL-SCNC: 105 MMOL/L (ref 98–112)
CHOLEST SERPL-MCNC: 189 MG/DL (ref ?–200)
CO2 SERPL-SCNC: 28 MMOL/L (ref 21–32)
CREAT BLD-MCNC: 1.17 MG/DL
CREAT UR-SCNC: 123.2 MG/DL
EGFRCR SERPLBLD CKD-EPI 2021: 75 ML/MIN/1.73M2 (ref 60–?)
EOSINOPHIL # BLD AUTO: 0.52 X10(3) UL (ref 0–0.7)
EOSINOPHIL NFR BLD AUTO: 5 %
ERYTHROCYTE [DISTWIDTH] IN BLOOD BY AUTOMATED COUNT: 11.9 %
EST. AVERAGE GLUCOSE BLD GHB EST-MCNC: 209 MG/DL (ref 68–126)
FASTING PATIENT LIPID ANSWER: YES
FASTING STATUS PATIENT QL REPORTED: YES
GLOBULIN PLAS-MCNC: 3 G/DL (ref 2.8–4.4)
GLUCOSE BLD-MCNC: 233 MG/DL (ref 70–99)
HBA1C MFR BLD: 8.9 % (ref ?–5.7)
HCT VFR BLD AUTO: 47.2 %
HDLC SERPL-MCNC: 57 MG/DL (ref 40–59)
HGB BLD-MCNC: 15.5 G/DL
IMM GRANULOCYTES # BLD AUTO: 0.04 X10(3) UL (ref 0–1)
IMM GRANULOCYTES NFR BLD: 0.4 %
LDLC SERPL CALC-MCNC: 104 MG/DL (ref ?–100)
LYMPHOCYTES # BLD AUTO: 2.87 X10(3) UL (ref 1–4)
LYMPHOCYTES NFR BLD AUTO: 27.8 %
MCH RBC QN AUTO: 30 PG (ref 26–34)
MCHC RBC AUTO-ENTMCNC: 32.8 G/DL (ref 31–37)
MCV RBC AUTO: 91.3 FL
MICROALBUMIN UR-MCNC: 3.8 MG/DL
MICROALBUMIN/CREAT 24H UR-RTO: 30.8 UG/MG (ref ?–30)
MONOCYTES # BLD AUTO: 0.75 X10(3) UL (ref 0.1–1)
MONOCYTES NFR BLD AUTO: 7.3 %
NEUTROPHILS # BLD AUTO: 6.09 X10 (3) UL (ref 1.5–7.7)
NEUTROPHILS # BLD AUTO: 6.09 X10(3) UL (ref 1.5–7.7)
NEUTROPHILS NFR BLD AUTO: 58.9 %
NONHDLC SERPL-MCNC: 132 MG/DL (ref ?–130)
OSMOLALITY SERPL CALC.SUM OF ELEC: 289 MOSM/KG (ref 275–295)
PLATELET # BLD AUTO: 222 10(3)UL (ref 150–450)
POTASSIUM SERPL-SCNC: 4.3 MMOL/L (ref 3.5–5.1)
PROT SERPL-MCNC: 7.6 G/DL (ref 5.7–8.2)
RBC # BLD AUTO: 5.17 X10(6)UL
SODIUM SERPL-SCNC: 135 MMOL/L (ref 136–145)
TRIGL SERPL-MCNC: 159 MG/DL (ref 30–149)
VLDLC SERPL CALC-MCNC: 27 MG/DL (ref 0–30)
WBC # BLD AUTO: 10.3 X10(3) UL (ref 4–11)

## 2024-07-24 PROCEDURE — 83036 HEMOGLOBIN GLYCOSYLATED A1C: CPT

## 2024-07-24 PROCEDURE — 80053 COMPREHEN METABOLIC PANEL: CPT

## 2024-07-24 PROCEDURE — 82570 ASSAY OF URINE CREATININE: CPT

## 2024-07-24 PROCEDURE — 99214 OFFICE O/P EST MOD 30 MIN: CPT

## 2024-07-24 PROCEDURE — 82043 UR ALBUMIN QUANTITATIVE: CPT

## 2024-07-24 PROCEDURE — 80061 LIPID PANEL: CPT

## 2024-07-24 PROCEDURE — 85025 COMPLETE CBC W/AUTO DIFF WBC: CPT

## 2024-07-24 RX ORDER — DICLOFENAC SODIUM 75 MG/1
75 TABLET, DELAYED RELEASE ORAL 2 TIMES DAILY
Qty: 20 TABLET | Refills: 0 | Status: SHIPPED | OUTPATIENT
Start: 2024-07-24

## 2024-07-24 NOTE — PROGRESS NOTES
[facility] Family Medicine Office Note  Chief Complaint:   Chief Complaint   Patient presents with    Blood Pressure    Follow - Up    Hand Pain     Left hand pain complaint of numbness and tingling        HPI:   This is a 49 year old male coming in for hypertension follow up. He resumed his blood pressure medication about 2 weeks ago. He is taking medication as prescribed. Blood pressures have been stable, ~120-130/80. Denies chest pain, palpitations, or shortness of breath. He completed lab work this morning, results are in process.     Over the last few years he has had episodes of numbness and tingling of left hand. A few days ago he developed numbness and tingling in first 3 fingers of left hand after he was doing work around the house. Has some discomfort in the wrist.    Has similar symptoms in his right hand. Had EMG testing of right upper extremity preformed in 2018 which revealed moderate to severe carpal tunnel syndrome. He is not interested in pursuing surgical intervention. States wrist splint does improve his symptoms but he does not wear routinely.     Past Medical History:    Diabetes (HCC)    Hyperlipidemia    Unspecified essential hypertension     Past Surgical History:   Procedure Laterality Date    Tonsillectomy       Social History:  Social History     Socioeconomic History    Marital status:    Tobacco Use    Smoking status: Former     Current packs/day: 0.00     Types: Cigarettes     Start date: 2001     Quit date: 2019     Years since quittin.5    Smokeless tobacco: Never   Vaping Use    Vaping status: Never Used   Substance and Sexual Activity    Alcohol use: Yes     Comment: socially    Drug use: No    Sexual activity: Yes     Partners: Female   Other Topics Concern    Caffeine Concern No    Exercise No    Seat Belt Yes     Family History:  Family History   Problem Relation Age of Onset    Cancer Father         colon ca at 66    Colon Cancer Father 64     Hypertension Mother      Allergies:  No Known Allergies  Current Meds:  Current Outpatient Medications   Medication Sig Dispense Refill    diclofenac 75 MG Oral Tab EC Take 1 tablet (75 mg total) by mouth 2 (two) times daily. 20 tablet 0    lisinopril-hydroCHLOROthiazide 20-25 MG Oral Tab Take 1 tablet by mouth daily. 90 tablet 0    metFORMIN 500 MG Oral Tab Take 1 tablet (500 mg total) by mouth 2 (two) times daily. 180 tablet 0    rosuvastatin 5 MG Oral Tab Take 1 tablet (5 mg total) by mouth nightly. 90 tablet 0      Counseling given: Not Answered       REVIEW OF SYSTEMS:   Review of Systems   Constitutional: Negative.    HENT: Negative.     Eyes: Negative.    Respiratory: Negative.     Cardiovascular: Negative.    Gastrointestinal: Negative.    Endocrine: Negative.    Genitourinary: Negative.    Musculoskeletal: Negative.    Skin: Negative.    Allergic/Immunologic: Negative.    Neurological:  Positive for numbness (in first 3 fingers of left hand).   Hematological: Negative.    Psychiatric/Behavioral: Negative.           EXAM:   /82   Pulse 74   Temp 97.5 °F (36.4 °C)   Resp 18   Ht 6' 0.01\" (1.829 m)   Wt 263 lb (119.3 kg)   BMI 35.66 kg/m²  Estimated body mass index is 35.66 kg/m² as calculated from the following:    Height as of this encounter: 6' 0.01\" (1.829 m).    Weight as of this encounter: 263 lb (119.3 kg).   Vital signs reviewed.Appears stated age, well groomed.  GENERAL: well developed, well nourished, in no apparent distress  SKIN: no rashes, no suspicious lesions   HEENT: atraumatic, normocephalic, ears and throat are clear. Normal TM's.  EYES:PERRLA, EOMI,conjunctiva are clear.  CHEST: no chest tenderness  LUNGS: clear to auscultation  CARDIO: RRR. Normal S1 and S2. No murmur, rub, or gallop.   GI: Soft, non-distended, non-tender. Good bowel sounds. No palpable masses. No HSM.   MUSCULOSKELETAL: No obvious joint swelling or deformity.   EXTREMITIES: no cyanosis, clubbing or edema.  Phalen's and Tinnel's tests are negative. Sensation and strength is intact and symmetric in bilateral hands and fingers. There is no weakness.   NEURO: Oriented times three,cranial nerves are intact,motor and sensory are grossly intact    ASSESSMENT AND PLAN:     1. Essential hypertension  - Stable on current regimen  - Continue current medication    2. Numbness and tingling in left hand  - diclofenac 75 MG Oral Tab EC; Take 1 tablet (75 mg total) by mouth 2 (two) times daily.  Dispense: 20 tablet; Refill: 0    3. Pain of left hand  - diclofenac 75 MG Oral Tab EC; Take 1 tablet (75 mg total) by mouth 2 (two) times daily.  Dispense: 20 tablet; Refill: 0    Symptoms in left hand were precipitated by overuse/ exertion. Discussed with patient that as he has been having these symptoms occasionally over the last few years, EMG testing of LUE is recommended. He does not wish to proceed with EMG testing at this time. He is aware that untreated neuropathy can lead to permanent dysfunction such as loss of sensation in hand/fingers, and weakness.   Monitor blood pressure when taking Diclofenac   Wrist splint at night   Notify me if symptoms persist, nerve study would be recommended at that point    Low carb, low fat diet  Watch intake of salt  Regular exercise    Patient will be moving to [place] within next few months-- he is uncertain on exact time frame. Advised to establish with PCP in [place].     Meds & Refills for this Visit:  Requested Prescriptions     Signed Prescriptions Disp Refills    diclofenac 75 MG Oral Tab EC 20 tablet 0     Sig: Take 1 tablet (75 mg total) by mouth 2 (two) times daily.       Health Maintenance:  Health Maintenance Due   Topic Date Due    Diabetes Care Dilated Eye Exam  Never done    Diabetes Care: Microalb/Creat Ratio  Never done    Annual Physical  Never done    Pneumococcal Vaccine: Birth to 64yrs (1 of 2 - PCV) Never done    DTaP,Tdap,and Td Vaccines (1 - Tdap) Never done    Diabetes Care A1C   03/10/2023    Zoster Vaccines (1 of 2) Never done    COVID-19 Vaccine (3 - 2023-24 season) 09/01/2023    Diabetes Care: GFR  09/10/2023    Colorectal Cancer Screening  12/12/2023    Annual Depression Screening  01/01/2024    Diabetes Care Foot Exam  01/23/2024       Patient/Caregiver Education: Patient/Caregiver Education: There are no barriers to learning. Medical education done.   Outcome: Patient verbalizes understanding. Patient is notified to call with any questions, complications, allergies, or worsening or changing symptoms.  Patient is to call with any side effects or complications from the treatments as a result of today.     Problem List:  Patient Active Problem List   Diagnosis    Acute sinusitis, unspecified    Essential hypertension    Numbness and tingling in right hand    Hypercholesteremia    Hyperglycemia    Cough    Nasal congestion    Acute pharyngitis    Type 2 diabetes mellitus with hyperglycemia, without long-term current use of insulin ([facility])       Radha Mejia, APRN    Please note that portions of this note may have been completed with a voice recognition program. Efforts were made to edit the dictations but occasionally words are mis-transcribed.    The 21st Century Cures Act makes medical notes like these available to patients in the interest of transparency. Please be advised this is a medical document. Medical documents are intended to carry relevant information, facts as evident, and the clinical opinion of the practitioner. The medical note is intended as peer to peer communication and may appear blunt or direct. It is written in medical language and may contain abbreviations or verbiage that are unfamiliar.

## 2024-07-24 NOTE — PATIENT INSTRUCTIONS
Monitor blood pressure when taking Diclofenac   Wrist splint at night   Notify me if symptoms persist, nerve study would be recommended at that point

## 2024-08-16 ENCOUNTER — TELEPHONE (OUTPATIENT)
Dept: FAMILY MEDICINE CLINIC | Facility: CLINIC | Age: 49
End: 2024-08-16

## 2024-08-19 NOTE — TELEPHONE ENCOUNTER
Patient didn't want to schedule the physical    Based off the schedule  from the primary care provider   
Please call patient to schedule annual physical examination with  .  Thank you!  
independent/needs device

## 2024-10-03 ENCOUNTER — PATIENT OUTREACH (OUTPATIENT)
Dept: FAMILY MEDICINE CLINIC | Facility: CLINIC | Age: 49
End: 2024-10-03

## 2024-10-24 DIAGNOSIS — I10 ESSENTIAL HYPERTENSION: ICD-10-CM

## 2024-10-25 RX ORDER — LISINOPRIL AND HYDROCHLOROTHIAZIDE 20; 25 MG/1; MG/1
1 TABLET ORAL DAILY
Qty: 90 TABLET | Refills: 0 | Status: SHIPPED | OUTPATIENT
Start: 2024-10-25

## 2025-04-21 ENCOUNTER — TELEPHONE (OUTPATIENT)
Dept: FAMILY MEDICINE CLINIC | Facility: CLINIC | Age: 50
End: 2025-04-21

## 2025-04-21 NOTE — TELEPHONE ENCOUNTER
[facility] pharmacy calling for refill. Previous [facility] patient     LOV: 7/24/24    NOV: None scheduled    Requesting refill: lisinopril-hydrochlorothiazide 20-25mg     Last refill: 10/25/24 #90       Patient is due for follow up. Please schedule with new PCP to establish care then route back to triage for refill. Thank you.

## 2025-05-03 ENCOUNTER — PATIENT OUTREACH (OUTPATIENT)
Dept: CASE MANAGEMENT | Age: 50
End: 2025-05-03

## 2025-05-03 NOTE — PROCEDURES
The office order for PCP removal request is Approved and finalized on May 3, 2025.    Removed Silva Hill MD as the patient's Primary Care Physician

## (undated) DIAGNOSIS — I10 ESSENTIAL HYPERTENSION: ICD-10-CM

## (undated) NOTE — LETTER
9/9/2019    [address] [facility]    Dear Mr. Pia Carpenter,     [address] [facility] office has been trying to contact you to schedule a visit with your doctor to address important healthcare needs.   It is important that you contact our office a